# Patient Record
Sex: MALE | Race: WHITE | NOT HISPANIC OR LATINO | Employment: FULL TIME | URBAN - METROPOLITAN AREA
[De-identification: names, ages, dates, MRNs, and addresses within clinical notes are randomized per-mention and may not be internally consistent; named-entity substitution may affect disease eponyms.]

---

## 2017-10-02 ENCOUNTER — OFFICE VISIT (OUTPATIENT)
Dept: URGENT CARE | Facility: CLINIC | Age: 47
End: 2017-10-02
Payer: COMMERCIAL

## 2017-10-02 PROCEDURE — S9083 URGENT CARE CENTER GLOBAL: HCPCS

## 2017-10-02 PROCEDURE — G0382 LEV 3 HOSP TYPE B ED VISIT: HCPCS

## 2017-10-03 NOTE — PROGRESS NOTES
Assessment  1  Spider bite (989 5,E905 1) (T63 301A)   2  Cellulitis (682 9) (L03 90)    Plan  Cellulitis    · Cephalexin 500 MG Oral Capsule; TAKE 1 CAPSULE 4 TIMES DAILY UNTIL GONE    Discussion/Summary  Discussion Summary:   1) take abx as prescribedwarm compresses to area several times a daytylenol/motrin for pain  Medication Side Effects Reviewed: Possible side effects of new medications were reviewed with the patient/guardian today  Understands and agrees with treatment plan: The treatment plan was reviewed with the patient/guardian  The patient/guardian understands and agrees with the treatment plan   Counseling Documentation With Imm: The patient, patient's family was counseled regarding instructions for management,-patient and family education,-importance of compliance with treatment  Chief Complaint  1  Skin Wound  Chief Complaint Free Text Note Form: Pt has a skin wound on his right buttock for a week and a half  History of Present Illness  HPI: 52yo M p/w insect bite to buttock x 1 5 weeks  Pt states he had a small bump on R buttock with some irritation but wound suddenly became larger, painful, warm and red x 2 days  Hospital Based Practices Required Assessment:   Abuse And Domestic Violence Screen    Yes, the patient is safe at home -The patient states no one is hurting them  Depression And Suicide Screen  No, the patient has not had thoughts of hurting themself  No, the patient has not felt depressed in the past 7 days  Prefered Language is  [place]  Primary Language is  English  Skin Wound: [facility] presents with complaints of skin wound  Review of Systems  Focused-Male:   Constitutional: no fever or chills, feels well, no tiredness, no recent weight loss or weight gain  ENT: no complaints of earache, no loss of hearing, no nosebleeds or nasal discharge, no sore throat or hoarseness     Cardiovascular: no complaints of slow or fast heart rate, no chest pain, no palpitations, no leg claudication or lower extremity edema  Respiratory: no complaints of shortness of breath, no wheezing or cough, no dyspnea on exertion, no orthopnea or PND  Gastrointestinal: no complaints of abdominal pain, no constipation, no nausea or vomiting, no diarrhea or bloody stools  Genitourinary: no complaints of dysuria or incontinence, no hesitancy, no nocturia, no genital lesion, no inadequacy of penile erection  Musculoskeletal: no complaints of arthralgia, no myalgia, no joint swelling or stiffness, no limb pain or swelling  Integumentary: skin wound, but-no rashes,-no itching,-no dry skin-and-no skin lesions  Neurological: no complaints of headache, no confusion, no numbness or tingling, no dizziness or fainting  ROS Reviewed:   ROS reviewed  Past Medical History  Active Problems And Past Medical History Reviewed: The active problems and past medical history were reviewed and updated today  Family History  Family History Reviewed: The family history was reviewed and updated today  Social History  Social History Reviewed: The social history was reviewed and is unchanged  Surgical History  Surgical History Reviewed: The surgical history was reviewed and updated today  Current Meds   1  No Reported Medications Recorded  Medication List Reviewed: The medication list was reviewed and updated today  Allergies  1  No Known Drug Allergies    Vitals  Signs   Recorded: [id number] 12:06PM   Temperature: 99 1 F  Heart Rate: 87  Respiration: 18  Systolic: 349  Diastolic: 86  Weight: 384 lb 1 81 oz  O2 Saturation: 95    Physical Exam    Constitutional   General appearance: No acute distress, well appearing and well nourished  Eyes   Conjunctiva and lids: No swelling, erythema, or discharge  Pupils and irises: Equal, round and reactive to light      Ears, Nose, Mouth, and Throat   External inspection of ears and nose: Normal     Pulmonary   Respiratory effort: No increased work of breathing or signs of respiratory distress  Auscultation of lungs: Clear to auscultation  no rales or crackles were heard bilaterally  no rhonchi  no friction rub  no wheezing  Cardiovascular   Palpation of heart: Normal PMI, no thrills  Auscultation of heart: Normal rate and rhythm, normal S1 and S2, without murmurs  Examination of extremities for edema and/or varicosities: Normal     Abdomen   Abdomen: Non-tender, no masses  Liver and spleen: No hepatomegaly or splenomegaly  Skin   Skin and subcutaneous tissue: Abnormal  -6cm diameter erythematous, warm to touch erosion with necrotic center on R inferior buttock  Neurologic   Cranial nerves: Cranial nerves 2-12 intact  Reflexes: 2+ and symmetric  Sensation: No sensory loss      Psychiatric   Orientation to person, place and time: Normal     Mood and affect: Normal        Signatures   Electronically signed by : LUCIA Bush; Oct  2 2017 12:40PM EST                       (Author)    Electronically signed by : KELTON Guerrier ; Oct  2 2017  1:24PM EST                       (Co-author)

## 2018-07-09 ENCOUNTER — HOSPITAL ENCOUNTER (EMERGENCY)
Facility: HOSPITAL | Age: 48
Discharge: HOME | End: 2018-07-09
Attending: EMERGENCY MEDICINE
Payer: COMMERCIAL

## 2018-07-09 VITALS
DIASTOLIC BLOOD PRESSURE: 99 MMHG | OXYGEN SATURATION: 100 % | HEART RATE: 78 BPM | HEIGHT: 72 IN | BODY MASS INDEX: 31.15 KG/M2 | TEMPERATURE: 98.3 F | RESPIRATION RATE: 18 BRPM | SYSTOLIC BLOOD PRESSURE: 177 MMHG | WEIGHT: 230 LBS

## 2018-07-09 DIAGNOSIS — T15.91XA FOREIGN BODY, EYE, RIGHT, INITIAL ENCOUNTER: Primary | ICD-10-CM

## 2018-07-09 PROCEDURE — 99283 EMERGENCY DEPT VISIT LOW MDM: CPT

## 2018-07-09 RX ORDER — TETRACAINE HYDROCHLORIDE 5 MG/ML
2 SOLUTION OPHTHALMIC ONCE
Status: DISCONTINUED | OUTPATIENT
Start: 2018-07-09 | End: 2018-07-09 | Stop reason: ALTCHOICE

## 2018-07-09 RX ADMIN — FLUORESCEIN SODIUM AND PROPARACAINE HYDROCHLORIDE 1 DROP: 2.5; 5 SOLUTION/ DROPS OPHTHALMIC at 00:50

## 2018-07-09 NOTE — DISCHARGE INSTRUCTIONS
Eye Foreign Body   WHAT YOU NEED TO KNOW:   You may have pain, sensitivity to light, or blurry vision for a few days  DISCHARGE INSTRUCTIONS:   Return to the emergency department if:   · You suddenly lose your vision  · You have severe eye pain  Contact your healthcare provider or ophthalmologist if:   · You have new or worse eye swelling  · Your symptoms do not get better, even after the foreign body is removed  · You have white or yellow fluid draining from your eye  · You have questions or concerns about your condition or care  Medicines: You may  need any of the following:  · Eye drops or eye ointment  may be given to prevent an infection and decrease pain  · NSAIDs , such as ibuprofen, help decrease swelling, pain, and fever  NSAIDs can cause stomach bleeding or kidney problems in certain people  If you take blood thinner medicine, always ask your healthcare provider if NSAIDs are safe for you  Always read the medicine label and follow directions  · Prescription pain medicine  may be given  Ask your healthcare provider how to take this medicine safely  Some prescription pain medicines contain acetaminophen  Do not take other medicines that contain acetaminophen without talking to your healthcare provider  Too much acetaminophen may cause liver damage  Prescription pain medicine may cause constipation  Ask your healthcare provider how to prevent or treat constipation  · Take your medicine as directed  Contact your healthcare provider if you think your medicine is not helping or if you have side effects  Tell him of her if you are allergic to any medicine  Keep a list of the medicines, vitamins, and herbs you take  Include the amounts, and when and why you take them  Bring the list or the pill bottles to follow-up visits  Carry your medicine list with you in case of an emergency  Help your eye heal:   · Do not rub your eye  This may cause more damage or infection       · Do not wear your contacts lenses until your eye heals  Ask your healthcare provider how long to follow this direction  · Wear sunglasses as directed  Sunglasses help protect the eye and decrease sensitivity to light  Prevent another EFB:   · Wear safety glasses, eye shields, or goggles  These items can prevent eye injury  Make sure the eyewear wraps around the sides of your face  Wear these items while you work with chemicals, metal, wood, or bodily fluids such as blood  Also wear protective eyewear during sports such as racquetball or swimming  Do not use regular eye glasses for eye protection  They will not protect your eyes from foreign bodies or chemicals  · Use contact lenses as directed  Wash your hands before you clean, insert, or remove your contacts  Insert and remove contact lenses correctly  Clean and change your contacts as directed to help prevent eye damage or infection  Follow up with your healthcare provider or ophthalmologist in 1 to 2 days:  Write down your questions so you remember to ask them during your visits  © 2017 [address] [facility] Information is for End User's use only and may not be sold, redistributed or otherwise used for commercial purposes  All illustrations and images included in CareNotes® are the copyrighted property of [employer]  or [employer]  The above information is an  only  It is not intended as medical advice for individual conditions or treatments  Talk to your doctor, nurse or pharmacist before following any medical regimen to see if it is safe and effective for you

## 2018-07-09 NOTE — ED PROVIDER NOTES
History  Chief Complaint   Patient presents with    Foreign Body in [address] [place] [address] TO RIGHT EYE TIMES 4 HOURS NOW RESOLVED  PATIENT REPORTEDLY DRIVING VEHICLE WITH WINDOW OPEN  ASSOCIATED TEARING  NO VISUAL DEFICITS  NO TRAUMA            None       Past Medical History:   Diagnosis Date    Hypertension        No past surgical history on file  No family history on file  I have reviewed and agree with the history as documented  Social History   Substance Use Topics    Smoking status: Current Every Day Smoker    Smokeless tobacco: Never Used    Alcohol use No        Review of Systems   Constitutional: Negative for chills and fever  HENT: Negative for ear pain, rhinorrhea and sore throat  Eyes: Positive for pain  Negative for photophobia, discharge and itching  Respiratory: Negative for cough and shortness of breath  Cardiovascular: Negative for chest pain and leg swelling  Gastrointestinal: Negative for abdominal pain, diarrhea, nausea and vomiting  Genitourinary: Negative for dysuria, flank pain, frequency and urgency  Musculoskeletal: Negative for back pain, myalgias and neck pain  Skin: Negative for rash  Neurological: Negative for dizziness, weakness, light-headedness and headaches  Hematological: Negative  Psychiatric/Behavioral: Negative for agitation, confusion and suicidal ideas  The patient is not nervous/anxious  All other systems reviewed and are negative  Physical Exam  Physical Exam   Constitutional: He is oriented to person, place, and time  He appears well-developed and well-nourished  HENT:   Nose: Nose normal    Mouth/Throat: Oropharynx is clear and moist  No oropharyngeal exudate  Eyes: Conjunctivae and EOM are normal  Pupils are equal, round, and reactive to light  Right eye exhibits no discharge  Left eye exhibits no discharge  No scleral icterus     A SMALL CHI OF DEBRIS WAS REMOVED FROM THE RIGHT LOWER LATERAL EYELID RIM  UPPER LID EVERSION NEG  FS NEG  NO CORNEAL DEFECT   Neck: Normal range of motion  Neck supple  No JVD present  No tracheal deviation present  Cardiovascular: Normal rate, regular rhythm and normal heart sounds  No murmur heard  Pulmonary/Chest: Effort normal and breath sounds normal  No respiratory distress  He has no wheezes  He has no rales  Abdominal: Soft  Bowel sounds are normal  There is no tenderness  There is no guarding  Musculoskeletal: Normal range of motion  He exhibits no edema or tenderness  Neurological: He is alert and oriented to person, place, and time  No cranial nerve deficit or sensory deficit  He exhibits normal muscle tone  5/5 motor, nl sens   Skin: Skin is warm and dry  Psychiatric: He has a normal mood and affect  His behavior is normal    Nursing note and vitals reviewed  Vital Signs  ED Triage Vitals [07/09/18 0021]   Temperature Pulse Respirations Blood Pressure SpO2   98 5 °F (36 9 °C) 83 16 (!) 183/100 100 %      Temp src Heart Rate Source Patient Position - Orthostatic VS BP Location FiO2 (%)   -- -- -- -- --      Pain Score       No Pain           Vitals:    07/09/18 0021   BP: (!) 183/100   Pulse: 83       Visual Acuity      ED Medications  Medications   tetracaine 0 5 % ophthalmic solution 2 drop (not administered)   fluorescein sodium sterile ophthalmic strip 1 strip (not administered)       Diagnostic Studies  Results Reviewed     None                 No orders to display              Procedures  Procedures       Phone Contacts  ED Phone Contact    ED Course                               [facility]  CritCare Time    Disposition  Final diagnoses:   None     ED Disposition     None      Follow-up Information    None         Patient's Medications    No medications on file     No discharge procedures on file      ED Provider  Electronically Signed by           Amy Melendez MD  07/09/18 8900

## 2018-11-16 ENCOUNTER — HOSPITAL ENCOUNTER (EMERGENCY)
Facility: HOSPITAL | Age: 48
Discharge: HOME | End: 2018-11-16
Attending: EMERGENCY MEDICINE
Payer: OTHER MISCELLANEOUS

## 2018-11-16 VITALS
HEIGHT: 71 IN | SYSTOLIC BLOOD PRESSURE: 172 MMHG | WEIGHT: 219 LBS | OXYGEN SATURATION: 98 % | BODY MASS INDEX: 30.66 KG/M2 | DIASTOLIC BLOOD PRESSURE: 93 MMHG | RESPIRATION RATE: 18 BRPM | TEMPERATURE: 97.9 F | HEART RATE: 74 BPM

## 2018-11-16 DIAGNOSIS — T15.00XA CORNEAL FOREIGN BODY: Primary | ICD-10-CM

## 2018-11-16 PROCEDURE — 90471 IMMUNIZATION ADMIN: CPT

## 2018-11-16 PROCEDURE — 90715 TDAP VACCINE 7 YRS/> IM: CPT | Performed by: EMERGENCY MEDICINE

## 2018-11-16 PROCEDURE — 99283 EMERGENCY DEPT VISIT LOW MDM: CPT

## 2018-11-16 RX ORDER — LISINOPRIL 10 MG/1
10 TABLET ORAL DAILY
COMMUNITY

## 2018-11-16 RX ORDER — OXYCODONE HYDROCHLORIDE AND ACETAMINOPHEN 5; 325 MG/1; MG/1
1 TABLET ORAL ONCE
Status: COMPLETED | OUTPATIENT
Start: 2018-11-16 | End: 2018-11-16

## 2018-11-16 RX ORDER — TETRACAINE HYDROCHLORIDE 5 MG/ML
2 SOLUTION OPHTHALMIC ONCE
Status: COMPLETED | OUTPATIENT
Start: 2018-11-16 | End: 2018-11-16

## 2018-11-16 RX ORDER — TOBRAMYCIN 3 MG/ML
1 SOLUTION/ DROPS OPHTHALMIC ONCE
Status: COMPLETED | OUTPATIENT
Start: 2018-11-16 | End: 2018-11-16

## 2018-11-16 RX ADMIN — TOBRAMYCIN 1 DROP: 3 SOLUTION/ DROPS OPHTHALMIC at 21:27

## 2018-11-16 RX ADMIN — TETRACAINE HYDROCHLORIDE 2 DROP: 5 SOLUTION OPHTHALMIC at 21:19

## 2018-11-16 RX ADMIN — OXYCODONE HYDROCHLORIDE AND ACETAMINOPHEN 1 TABLET: 5; 325 TABLET ORAL at 21:27

## 2018-11-16 RX ADMIN — TETANUS TOXOID, REDUCED DIPHTHERIA TOXOID AND ACELLULAR PERTUSSIS VACCINE, ADSORBED 0.5 ML: 5; 2.5; 8; 8; 2.5 SUSPENSION INTRAMUSCULAR at 21:27

## 2018-11-17 ENCOUNTER — HOSPITAL ENCOUNTER (EMERGENCY)
Facility: HOSPITAL | Age: 48
Discharge: HOME | End: 2018-11-17
Attending: EMERGENCY MEDICINE | Admitting: EMERGENCY MEDICINE
Payer: OTHER MISCELLANEOUS

## 2018-11-17 VITALS
RESPIRATION RATE: 20 BRPM | BODY MASS INDEX: 30.56 KG/M2 | SYSTOLIC BLOOD PRESSURE: 168 MMHG | WEIGHT: 218.26 LBS | HEIGHT: 71 IN | TEMPERATURE: 97.7 F | DIASTOLIC BLOOD PRESSURE: 93 MMHG | OXYGEN SATURATION: 94 % | HEART RATE: 61 BPM

## 2018-11-17 DIAGNOSIS — H16.001 CORNEAL ULCERATION, RIGHT: Primary | ICD-10-CM

## 2018-11-17 DIAGNOSIS — S05.01XD: ICD-10-CM

## 2018-11-17 DIAGNOSIS — H16.9: ICD-10-CM

## 2018-11-17 PROCEDURE — 99283 EMERGENCY DEPT VISIT LOW MDM: CPT

## 2018-11-17 NOTE — ED NOTES
Labeled tobrex 2-3 drops 3 times a day while awake per dr Karin Alvarez instructions  Pt denied questions or needs        Davin Wright RN  11/16/18 0128

## 2018-11-17 NOTE — DISCHARGE INSTRUCTIONS
Continue tobramycin drops as prescribed follow up promptly with Ophthalmology today  Corneal Ulcer   WHAT YOU NEED TO KNOW:   A corneal ulcer is an open sore on your cornea  The cornea is the smooth, clear outer layer of your eye  A corneal ulcer is caused by bacteria that get into your eye, such as through a scratch  DISCHARGE INSTRUCTIONS:   Medicines:   · NSAIDs:  These medicines decrease swelling, pain, and fever  NSAIDs are available without a doctor's order  Ask your healthcare provider which medicine is right for you  Ask how much to take and when to take it  Take as directed  NSAIDs can cause stomach bleeding and kidney problems if not taken correctly  · Antibiotic eye medicine: This is given to treat an infection caused by bacteria  It may be in eyedrops or an ointment  · Cycloplegic eye medicine: This will dilate your pupil and relax your eye muscles, which will decrease your pain  · Pain medicines: You may be given prescription medicine to take away or decrease pain  Do not wait until the pain is severe before you take your medicine  · Take your medicine as directed  Contact your healthcare provider if you think your medicine is not helping or if you have side effects  Tell him of her if you are allergic to any medicine  Keep a list of the medicines, vitamins, and herbs you take  Include the amounts, and when and why you take them  Bring the list or the pill bottles to follow-up visits  Carry your medicine list with you in case of an emergency  Follow up with your healthcare provider or eye specialist within 24 hours: You may need to see your eye specialist every 1 to 3 days if your condition is severe  Write down your questions so you remember to ask them during your visits  Manage your symptoms:   · Apply a warm compress:  Wet a washcloth with warm water and place it on your eye  This will help decrease swelling and pain  Use as often as directed      · Clean around your eye: Gently remove any crusty buildup around your eye  · Use eyedrops: This will keep your eyes moist and help decrease pain  · Use safety equipment:  Wear sunglasses or safety goggles to avoid another injury  · Ask about your contacts:  Do not wear contact lenses until your healthcare provider says it is okay  Always clean your contact lenses with proper contact   Contact your healthcare provider or eye specialist if:   · Your vision gets worse  · Your symptoms do not improve with treatment  · You have questions or concerns about your condition or care  Return to the emergency department if:   · You have severe eye pain  · You lose your vision  · You think your corneal ulcer is getting bigger  · You injure your eye again  © 2017 [address] [facility] Information is for End User's use only and may not be sold, redistributed or otherwise used for commercial purposes  All illustrations and images included in CareNotes® are the copyrighted property of [employer]  or [employer]  The above information is an  only  It is not intended as medical advice for individual conditions or treatments  Talk to your doctor, nurse or pharmacist before following any medical regimen to see if it is safe and effective for you  Keratitis   WHAT YOU NEED TO KNOW:   Keratitis is inflammation of the cornea  The cornea is the clear layer that covers the front of your eye  Keratitis usually affects one eye, but you can have it in both eyes  DISCHARGE INSTRUCTIONS:   Medicines:   · Infection medicine: You may be given medicine to treat an infection caused by bacteria, a fungus, or a virus  These medicines may be given as eyedrops or as pills  · Steroids: This medicine decreases inflammation  It is given as eyedrops  · Take your medicine as directed    Contact your healthcare provider if you think your medicine is not helping or if you have side effects  Tell him of her if you are allergic to any medicine  Keep a list of the medicines, vitamins, and herbs you take  Include the amounts, and when and why you take them  Bring the list or the pill bottles to follow-up visits  Carry your medicine list with you in case of an emergency  Follow up with or ophthalmologist as directed:  Write down your questions so you remember to ask them during your visits  Help prevent keratitis:   · Use contact lenses correctly  Know how long to use them and how to clean and store them properly  · Wash your hands often  Use soap and water  Wash your hands after you use the bathroom, change a child's diapers, or sneeze  Wash your hands before you prepare or eat food  · Wear safety equipment when you work, garden, or play sports  · Do not rub your eyes while you work with wood or metal   Contact your ophthalmologist if:   · You have a fever  · Your symptoms get worse, even after treatment  · You have pus draining from your eye  · You have questions or concerns about your condition or care  Return to the emergency department if:   · You have severe eye pain  · You have a sudden change in your vision  · You suddenly lose your vision  © 2017 [address] [employer] Information is for End User's use only and may not be sold, redistributed or otherwise used for commercial purposes  All illustrations and images included in CareNotes® are the copyrighted property of [employer]  or [employer]  The above information is an  only  It is not intended as medical advice for individual conditions or treatments  Talk to your doctor, nurse or pharmacist before following any medical regimen to see if it is safe and effective for you

## 2018-11-17 NOTE — ED PROVIDER NOTES
History  Chief Complaint   Patient presents with    Eye Pain     Patient c/o pain in right eye since Thursday, states "got rust in it at work "  Was to urgent care and [facility] last night  Currently on Cipro drops  Patient is a 18-year-old male who recently had a foreign body in his right cornea removed at urgent care and then had bur  Procedure Done to remove rust ring around the foreign body and has since had pain in the right high he was seen at the emergency department at [facility] for the same last night was prescribed Vicodin which did not help and therefore called EMS this morning for continued eye pain  Patient reports blurring of vision in the right eye but is able to perceive light and object still  No injury to the left eye  History provided by:  Patient  Eye Problem   Location:  Right eye  Quality:  Burning  Severity:  Moderate  Onset quality:  Gradual  Duration:  1 day  Timing:  Constant  Progression:  Worsening  Chronicity:  New  Context: foreign body and machinery    Foreign body:  Metal  Associated symptoms: blurred vision, discharge, inflammation, redness and tearing    Associated symptoms: no headaches, no nausea, no numbness, no vomiting and no weakness    Risk factors: previous injury to eye        Prior to Admission Medications   Prescriptions Last Dose Informant Patient Reported? Taking?   lisinopril (ZESTRIL) 10 mg tablet   Yes No   Sig: Take 10 mg by mouth daily   tobramycin (TOBREX) 0 3 % OINT   No No   Sig: Administer 0 5 inches to the right eye 3 (three) times a day      Facility-Administered Medications: None       Past Medical History:   Diagnosis Date    Hypertension        Past Surgical History:   Procedure Laterality Date    NO PAST SURGERIES         History reviewed  No pertinent family history  I have reviewed and agree with the history as documented      Social History   Substance Use Topics    Smoking status: Current Every Day Smoker     Packs/day: 1 00    Smokeless tobacco: Never Used    Alcohol use Yes      Comment: occasionally        Review of Systems   Constitutional: Negative for activity change, appetite change, chills, fatigue and fever  HENT: Negative for congestion, ear pain, rhinorrhea and sore throat  Eyes: Positive for blurred vision, pain, discharge, redness and visual disturbance  Respiratory: Negative for cough, chest tightness, shortness of breath and wheezing  Cardiovascular: Negative for chest pain and palpitations  Gastrointestinal: Negative for abdominal pain, constipation, diarrhea, nausea and vomiting  Endocrine: Negative for polydipsia and polyuria  Genitourinary: Negative for difficulty urinating, dysuria, frequency, hematuria and urgency  Musculoskeletal: Negative for arthralgias and myalgias  Skin: Negative for color change, pallor and rash  Neurological: Negative for dizziness, weakness, light-headedness, numbness and headaches  Hematological: Negative for adenopathy  Does not bruise/bleed easily  All other systems reviewed and are negative  Physical Exam  Physical Exam   Constitutional: He is oriented to person, place, and time  He appears well-developed and well-nourished  HENT:   Head: Normocephalic and atraumatic  Right Ear: External ear normal    Left Ear: External ear normal    Nose: Nose normal    Mouth/Throat: Oropharynx is clear and moist    Eyes: Pupils are equal, round, and reactive to light  EOM and lids are normal  Lids are everted and swept, no foreign bodies found  Right conjunctiva is injected  Slit lamp exam:       The right eye shows corneal ulcer and fluorescein uptake  Neck: Normal range of motion  Neck supple  Cardiovascular: Normal rate, regular rhythm, normal heart sounds and intact distal pulses  Pulmonary/Chest: Effort normal and breath sounds normal  No respiratory distress  He has no wheezes  He has no rales  He exhibits no tenderness  Abdominal: Soft  Bowel sounds are normal  He exhibits no distension  There is no tenderness  There is no guarding  Musculoskeletal: Normal range of motion  Neurological: He is alert and oriented to person, place, and time  No cranial nerve deficit or sensory deficit  Skin: Skin is warm and dry  Psychiatric: He has a normal mood and affect  Nursing note and vitals reviewed  Vital Signs  ED Triage Vitals [11/17/18 0549]   Temperature Pulse Respirations Blood Pressure SpO2   97 7 °F (36 5 °C) 61 20 168/93 94 %      Temp Source Heart Rate Source Patient Position - Orthostatic VS BP Location FiO2 (%)   Temporal Monitor Sitting Left arm --      Pain Score       No Pain           Vitals:    11/17/18 0549   BP: 168/93   Pulse: 61   Patient Position - Orthostatic VS: Sitting       Visual Acuity      ED Medications  Medications - No data to display    Diagnostic Studies  Results Reviewed     None                 No orders to display              Procedures  Procedures       Phone Contacts  ED Phone Contact    ED Course        right eye anesthetized with  Flucaine Drops in the emergency department he had relief of symptoms and felt much improved   exam in ED is consistent with right eye corneal ulceration likely secondary to recent foreign body removed patient is currently using the tobramycin drops he was prescribed any has them with him and is advised to continue using them as prescribed       I contacted our on-call ophthalmology group and spoke with their on-call staff they discussed the case with Dr Tiago Angeles who is on-call currently and they have arranged an appointment for the patient at 7:00 a m  In their office this morning for further evaluation and treatment the patient will be discharged this morning with instructions to proceed directly to this appointment for further evaluation and treatment return precautions and anticipatory guidance discussed                  MDM  Number of Diagnoses or Management Options  Corneal ulceration, right: new and does not require workup  Infected corneal abrasion, right, subsequent encounter: new and does not require workup  Risk of Complications, Morbidity, and/or Mortality  Presenting problems: low  Management options: low    Patient Progress  Patient progress: stable    CritCare Time    Disposition  Final diagnoses:   Corneal ulceration, right   Infected corneal abrasion, right, subsequent encounter     Time reflects when diagnosis was documented in both MDM as applicable and the Disposition within this note     Time User Action Codes Description Comment    11/17/2018  6:00 AM Floyd Smalls Add [S05 01XA] Abrasion of right cornea, initial encounter     11/17/2018  6:00 AM Floyd Smalls Add [H16 001] Corneal ulceration, right     11/17/2018  6:01 AM Floyd Moreno Modify [H16 001] Corneal ulceration, right     11/17/2018  6:01 AM Remaley, Lazarus Slim Remove [S05 01XA] Abrasion of right cornea, initial encounter     11/17/2018  6:01 AM Remaley, Lazarus Slim Add [S05 01XD] Infected corneal abrasion, right, subsequent encounter       ED Disposition     ED Disposition Condition Comment    Discharge  Marlonfarshad Rupert discharge to home/self care  Condition at discharge: Stable        Follow-up Information     Follow up With Specialties Details Why Contact Info    Linus Prasad OD Optometry Go today  office staff will call you today to arrange follow-up appointment for further  evaluation and treatment [id number]      Didi Montgomery MD Ophthalmology Call As needed [address] [place] [address],  [address] [phone]            Patient's Medications   Discharge Prescriptions    No medications on file     No discharge procedures on file      ED Provider  Electronically Signed by           Saintclair Gosselin, DO  11/17/18 6992

## 2018-11-17 NOTE — DISCHARGE INSTRUCTIONS
Eye Foreign Body   WHAT YOU NEED TO KNOW:   You may have pain, sensitivity to light, or blurry vision for a few days  DISCHARGE INSTRUCTIONS:   Return to the emergency department if:   · You suddenly lose your vision  · You have severe eye pain  Contact your healthcare provider or ophthalmologist if:   · You have new or worse eye swelling  · Your symptoms do not get better, even after the foreign body is removed  · You have white or yellow fluid draining from your eye  · You have questions or concerns about your condition or care  Medicines: You may  need any of the following:  · Eye drops or eye ointment  may be given to prevent an infection and decrease pain  · NSAIDs , such as ibuprofen, help decrease swelling, pain, and fever  NSAIDs can cause stomach bleeding or kidney problems in certain people  If you take blood thinner medicine, always ask your healthcare provider if NSAIDs are safe for you  Always read the medicine label and follow directions  · Prescription pain medicine  may be given  Ask your healthcare provider how to take this medicine safely  Some prescription pain medicines contain acetaminophen  Do not take other medicines that contain acetaminophen without talking to your healthcare provider  Too much acetaminophen may cause liver damage  Prescription pain medicine may cause constipation  Ask your healthcare provider how to prevent or treat constipation  · Take your medicine as directed  Contact your healthcare provider if you think your medicine is not helping or if you have side effects  Tell him of her if you are allergic to any medicine  Keep a list of the medicines, vitamins, and herbs you take  Include the amounts, and when and why you take them  Bring the list or the pill bottles to follow-up visits  Carry your medicine list with you in case of an emergency  Help your eye heal:   · Do not rub your eye  This may cause more damage or infection       · Do not wear your contacts lenses until your eye heals  Ask your healthcare provider how long to follow this direction  · Wear sunglasses as directed  Sunglasses help protect the eye and decrease sensitivity to light  Prevent another EFB:   · Wear safety glasses, eye shields, or goggles  These items can prevent eye injury  Make sure the eyewear wraps around the sides of your face  Wear these items while you work with chemicals, metal, wood, or bodily fluids such as blood  Also wear protective eyewear during sports such as racquetball or swimming  Do not use regular eye glasses for eye protection  They will not protect your eyes from foreign bodies or chemicals  · Use contact lenses as directed  Wash your hands before you clean, insert, or remove your contacts  Insert and remove contact lenses correctly  Clean and change your contacts as directed to help prevent eye damage or infection  Follow up with your healthcare provider or ophthalmologist in 1 to 2 days:  Write down your questions so you remember to ask them during your visits  © 2017 [facility] Information is for End User's use only and may not be sold, redistributed or otherwise used for commercial purposes  All illustrations and images included in CareNotes® are the copyrighted property of [employer]  or Zuhair Machado  The above information is an  only  It is not intended as medical advice for individual conditions or treatments  Talk to your doctor, nurse or pharmacist before following any medical regimen to see if it is safe and effective for you

## 2018-11-17 NOTE — ED PROVIDER NOTES
History  Chief Complaint   Patient presents with    Eye Injury     foreign body in right eye since thursday  went to [facility] earlier today and "they removed one piece of it and ground another piece out" Pain unbearable now     Patient is a 41-year-old man who works as a   He got rust in his eye 2 days ago and he went to [facility] earlier today and the foreign body was removed and the rust ring was ground out  He was discharged home with a prescription for eyedrops which she has not yet filled  He did not receive anything for pain  He is now here because he is having a foreign body sensation in his right eye and he needs pain relief  Patient says that his vision is normal   He has had no fevers or chills  He has no discharge  He denies any signs or symptoms of infection            Prior to Admission Medications   Prescriptions Last Dose Informant Patient Reported? Taking?   lisinopril (ZESTRIL) 10 mg tablet   Yes Yes   Sig: Take 10 mg by mouth daily      Facility-Administered Medications: None       Past Medical History:   Diagnosis Date    Hypertension        Past Surgical History:   Procedure Laterality Date    NO PAST SURGERIES         History reviewed  No pertinent family history  I have reviewed and agree with the history as documented  Social History   Substance Use Topics    Smoking status: Current Every Day Smoker     Packs/day: 1 00    Smokeless tobacco: Never Used    Alcohol use Yes      Comment: occasionally        Review of Systems   Constitutional: Negative for chills, fatigue, fever and unexpected weight change  HENT: Negative for congestion and nosebleeds  Eyes: Negative for visual disturbance  Respiratory: Negative for chest tightness and shortness of breath  Cardiovascular: Negative for chest pain, palpitations and leg swelling  Gastrointestinal: Negative for abdominal pain, blood in stool, diarrhea, nausea and vomiting     Endocrine: Negative for cold intolerance and heat intolerance  Genitourinary: Negative for difficulty urinating  Musculoskeletal: Negative for arthralgias, back pain, gait problem, joint swelling and myalgias  Skin: Negative for rash  Neurological: Negative for dizziness, speech difficulty, weakness and headaches  Psychiatric/Behavioral: Negative for behavioral problems, confusion, self-injury and suicidal ideas  All other systems reviewed and are negative  Physical Exam  Physical Exam   Constitutional: He is oriented to person, place, and time  He appears well-developed and well-nourished  HENT:   Head: Normocephalic and atraumatic  Nose: Nose normal    Eyes: Pupils are equal, round, and reactive to light  EOM are normal    Even without floor seen I can see where he had the foreign body removed  It is to the nasal side of the pupil over the iris  There is no sign or symptom of infection  There is no discharge  Visual acuities are noted  Neck: Normal range of motion  Neck supple  Cardiovascular: Normal rate, regular rhythm and normal heart sounds  Exam reveals no gallop and no friction rub  No murmur heard  Pulmonary/Chest: Effort normal and breath sounds normal  No respiratory distress  He has no wheezes  He has no rales  Abdominal: Soft  He exhibits no distension  There is no tenderness  There is no rebound and no guarding  Musculoskeletal: Normal range of motion  He exhibits no edema  Neurological: He is alert and oriented to person, place, and time  Skin: Skin is warm and dry  Psychiatric: He has a normal mood and affect  His behavior is normal  Judgment and thought content normal    Nursing note and vitals reviewed        Vital Signs  ED Triage Vitals [11/16/18 2105]   Temperature Pulse Respirations Blood Pressure SpO2   97 9 °F (36 6 °C) 74 18 (!) 172/93 98 %      Temp src Heart Rate Source Patient Position - Orthostatic VS BP Location FiO2 (%)   -- -- -- -- --      Pain Score       5 Vitals:    11/16/18 2105   BP: (!) 172/93   Pulse: 74       Visual Acuity  Visual Acuity      Most Recent Value   Visual acuity R eye is  -- [states "i cant see anything  its all a big blur"]   Visual acuity Left eye is  20/30   Visual acuity in both eyes is  20/30          ED Medications  Medications   tetracaine 0 5 % ophthalmic solution 2 drop (2 drops Right Eye Given 11/16/18 2119)   tetanus-diphtheria-acellular pertussis (BOOSTRIX) IM injection 0 5 mL (0 5 mL Intramuscular Given 11/16/18 2127)   oxyCODONE-acetaminophen (PERCOCET) 5-325 mg per tablet 1 tablet (1 tablet Oral Given 11/16/18 2127)   tobramycin (TOBREX) 0 3 % ophthalmic solution 1 drop (1 drop Right Eye Given 11/16/18 2127)       Diagnostic Studies  Results Reviewed     None                 No orders to display              Procedures  Procedures       Phone Contacts  ED Phone Contact    ED Course  ED Course as of Nov 17 0233 Fri Nov 16, 2018 2119 Patient is here for pain relief  I will put a cycloplegic in his eye and gave him immediate relief with tetracaine  I will discharge with several Vicodin for him to follow up with Ophthalmology tomorrow morning  MDM  CritCare Time    Disposition  Final diagnoses:   Corneal foreign body     Time reflects when diagnosis was documented in both MDM as applicable and the Disposition within this note     Time User Action Codes Description Comment    11/16/2018  9:24 PM Vania Keene Add [T15 00XA] Corneal foreign body       ED Disposition     ED Disposition Condition Comment    Discharge  [facility] discharge to home/self care      Condition at discharge: Good        Follow-up Information     Follow up With Specialties Details Why [address] [facility] [address] [place] [address]      Ila Juárez MD Ophthalmology Call  [address] [place] [address] Drive,  [address] [phone]            Discharge Medication List as of 11/16/2018  9:26 PM      START taking these medications    Details   tobramycin (TOBREX) 0 3 % OINT Administer 0 5 inches to the right eye 3 (three) times a day, Starting Fri 11/16/2018, Print         CONTINUE these medications which have NOT CHANGED    Details   lisinopril (ZESTRIL) 10 mg tablet Take 10 mg by mouth daily, Historical Med           No discharge procedures on file      ED Provider  Electronically Signed by           Andrea Hand MD  11/17/18 5621

## 2019-06-16 ENCOUNTER — APPOINTMENT (EMERGENCY)
Dept: RADIOLOGY | Facility: HOSPITAL | Age: 49
End: 2019-06-16
Payer: COMMERCIAL

## 2019-06-16 ENCOUNTER — HOSPITAL ENCOUNTER (OUTPATIENT)
Facility: HOSPITAL | Age: 49
Setting detail: OBSERVATION
Discharge: HOME | End: 2019-06-16
Attending: EMERGENCY MEDICINE | Admitting: INTERNAL MEDICINE
Payer: COMMERCIAL

## 2019-06-16 VITALS
HEIGHT: 71 IN | BODY MASS INDEX: 28.7 KG/M2 | DIASTOLIC BLOOD PRESSURE: 76 MMHG | SYSTOLIC BLOOD PRESSURE: 140 MMHG | HEART RATE: 65 BPM | OXYGEN SATURATION: 96 % | RESPIRATION RATE: 19 BRPM | TEMPERATURE: 97 F | WEIGHT: 205 LBS

## 2019-06-16 DIAGNOSIS — R07.9 CHEST PAIN: Primary | ICD-10-CM

## 2019-06-16 DIAGNOSIS — D72.829 LEUKOCYTOSIS: ICD-10-CM

## 2019-06-16 DIAGNOSIS — R77.8 ELEVATED TROPONIN: ICD-10-CM

## 2019-06-16 LAB
ANION GAP SERPL CALCULATED.3IONS-SCNC: 6 MMOL/L (ref 4–13)
ATRIAL RATE: 89 BPM
BASOPHILS # BLD AUTO: 0.1 THOUSANDS/ΜL (ref 0–0.1)
BASOPHILS NFR BLD AUTO: 0 % (ref 0–2)
BUN SERPL-MCNC: 24 MG/DL (ref 7–25)
CALCIUM SERPL-MCNC: 9.1 MG/DL (ref 8.6–10.5)
CHLORIDE SERPL-SCNC: 104 MMOL/L (ref 98–107)
CO2 SERPL-SCNC: 28 MMOL/L (ref 21–31)
CREAT SERPL-MCNC: 0.95 MG/DL (ref 0.7–1.3)
EOSINOPHIL # BLD AUTO: 0.3 THOUSAND/ΜL (ref 0–0.61)
EOSINOPHIL NFR BLD AUTO: 2 % (ref 0–5)
ERYTHROCYTE [DISTWIDTH] IN BLOOD BY AUTOMATED COUNT: 14.7 % (ref 11.5–14.5)
GFR SERPL CREATININE-BSD FRML MDRD: 95 ML/MIN/1.73SQ M
GLUCOSE SERPL-MCNC: 109 MG/DL (ref 65–99)
HCT VFR BLD AUTO: 32.5 % (ref 42–47)
HGB BLD-MCNC: 10.7 G/DL (ref 14–18)
LYMPHOCYTES # BLD AUTO: 1.2 THOUSANDS/ΜL (ref 0.6–4.47)
LYMPHOCYTES NFR BLD AUTO: 7 % (ref 21–51)
MCH RBC QN AUTO: 27.2 PG (ref 26–34)
MCHC RBC AUTO-ENTMCNC: 32.9 G/DL (ref 31–37)
MCV RBC AUTO: 83 FL (ref 81–99)
MONOCYTES # BLD AUTO: 1.3 THOUSAND/ΜL (ref 0.17–1.22)
MONOCYTES NFR BLD AUTO: 8 % (ref 2–12)
NEUTROPHILS # BLD AUTO: 14 THOUSANDS/ΜL (ref 1.4–6.5)
NEUTS SEG NFR BLD AUTO: 83 % (ref 42–75)
P AXIS: 81 DEGREES
PLATELET # BLD AUTO: 267 THOUSANDS/UL (ref 149–390)
PMV BLD AUTO: 9 FL (ref 8.6–11.7)
POTASSIUM SERPL-SCNC: 3.8 MMOL/L (ref 3.5–5.5)
PR INTERVAL: 154 MS
QRS AXIS: 73 DEGREES
QRSD INTERVAL: 102 MS
QT INTERVAL: 416 MS
QTC INTERVAL: 506 MS
RBC # BLD AUTO: 3.92 MILLION/UL (ref 4.3–5.9)
SODIUM SERPL-SCNC: 138 MMOL/L (ref 134–143)
T WAVE AXIS: 55 DEGREES
TROPONIN I SERPL-MCNC: 0.04 NG/ML
TROPONIN I SERPL-MCNC: 0.04 NG/ML
VENTRICULAR RATE: 89 BPM
WBC # BLD AUTO: 16.8 THOUSAND/UL (ref 4.8–10.8)

## 2019-06-16 PROCEDURE — 36415 COLL VENOUS BLD VENIPUNCTURE: CPT | Performed by: EMERGENCY MEDICINE

## 2019-06-16 PROCEDURE — 84484 ASSAY OF TROPONIN QUANT: CPT | Performed by: EMERGENCY MEDICINE

## 2019-06-16 PROCEDURE — 71045 X-RAY EXAM CHEST 1 VIEW: CPT

## 2019-06-16 PROCEDURE — 99235 HOSP IP/OBS SAME DATE MOD 70: CPT | Performed by: INTERNAL MEDICINE

## 2019-06-16 PROCEDURE — 99244 OFF/OP CNSLTJ NEW/EST MOD 40: CPT | Performed by: PHYSICIAN ASSISTANT

## 2019-06-16 PROCEDURE — 99285 EMERGENCY DEPT VISIT HI MDM: CPT

## 2019-06-16 PROCEDURE — 93010 ELECTROCARDIOGRAM REPORT: CPT | Performed by: INTERNAL MEDICINE

## 2019-06-16 PROCEDURE — 85025 COMPLETE CBC W/AUTO DIFF WBC: CPT | Performed by: EMERGENCY MEDICINE

## 2019-06-16 PROCEDURE — 93005 ELECTROCARDIOGRAM TRACING: CPT

## 2019-06-16 PROCEDURE — 80048 BASIC METABOLIC PNL TOTAL CA: CPT | Performed by: EMERGENCY MEDICINE

## 2019-06-16 RX ORDER — OMEPRAZOLE 20 MG/1
20 CAPSULE, DELAYED RELEASE ORAL DAILY
COMMUNITY
End: 2021-07-27 | Stop reason: SDUPTHER

## 2019-06-16 RX ORDER — ALBUTEROL SULFATE 90 UG/1
2 AEROSOL, METERED RESPIRATORY (INHALATION) EVERY 6 HOURS PRN
COMMUNITY

## 2019-06-16 RX ORDER — ACETAMINOPHEN 325 MG/1
975 TABLET ORAL ONCE
Status: COMPLETED | OUTPATIENT
Start: 2019-06-16 | End: 2019-06-16

## 2019-06-16 RX ORDER — LISINOPRIL 10 MG/1
10 TABLET ORAL DAILY
Status: DISCONTINUED | OUTPATIENT
Start: 2019-06-16 | End: 2019-06-16 | Stop reason: HOSPADM

## 2019-06-16 RX ORDER — ASPIRIN 81 MG/1
81 TABLET ORAL DAILY
Status: DISCONTINUED | OUTPATIENT
Start: 2019-06-16 | End: 2019-06-16 | Stop reason: HOSPADM

## 2019-06-16 RX ORDER — NICOTINE 21 MG/24HR
1 PATCH, TRANSDERMAL 24 HOURS TRANSDERMAL DAILY
Status: DISCONTINUED | OUTPATIENT
Start: 2019-06-16 | End: 2019-06-16 | Stop reason: HOSPADM

## 2019-06-16 RX ORDER — ALBUTEROL SULFATE 90 UG/1
2 AEROSOL, METERED RESPIRATORY (INHALATION) EVERY 6 HOURS PRN
Status: DISCONTINUED | OUTPATIENT
Start: 2019-06-16 | End: 2019-06-16 | Stop reason: HOSPADM

## 2019-06-16 RX ORDER — ATORVASTATIN CALCIUM 40 MG/1
40 TABLET, FILM COATED ORAL
Status: DISCONTINUED | OUTPATIENT
Start: 2019-06-16 | End: 2019-06-16 | Stop reason: HOSPADM

## 2019-06-16 RX ORDER — PANTOPRAZOLE SODIUM 40 MG/1
40 TABLET, DELAYED RELEASE ORAL
Status: DISCONTINUED | OUTPATIENT
Start: 2019-06-16 | End: 2019-06-16 | Stop reason: HOSPADM

## 2019-06-16 RX ADMIN — PANTOPRAZOLE SODIUM 40 MG: 40 TABLET, DELAYED RELEASE ORAL at 08:38

## 2019-06-16 RX ADMIN — ASPIRIN 81 MG: 81 TABLET, COATED ORAL at 08:56

## 2019-06-16 RX ADMIN — ACETAMINOPHEN 975 MG: 325 TABLET ORAL at 06:18

## 2019-06-16 RX ADMIN — LISINOPRIL 10 MG: 10 TABLET ORAL at 08:18

## 2019-07-23 ENCOUNTER — CLINICAL SUPPORT (OUTPATIENT)
Dept: CARDIAC REHAB | Facility: HOSPITAL | Age: 49
End: 2019-07-23
Payer: COMMERCIAL

## 2019-07-23 DIAGNOSIS — Z95.1 S/P CABG (CORONARY ARTERY BYPASS GRAFT): ICD-10-CM

## 2019-07-23 PROCEDURE — 93797 PHYS/QHP OP CAR RHAB WO ECG: CPT

## 2019-07-23 NOTE — PROGRESS NOTES
CARDIAC REHAB ASSESSMENT    Today's date: 2019  Patient name: Author Channel     : 1970       MRN: [id number]  PCP: Galileo Vásquez DO  Referring Physician: Anne Marie Miller MD  Cardiologist: Janett Baptiste ([facility])  Surgeon: Dr Nitin Kelsey ([facility])  Dx: CABG x 2  Date of onset: 2019  Cultural needs: None  Height:    Wt Readings from Last 1 Encounters:   19 93 kg (205 lb)      Weight:   Ht Readings from Last 1 Encounters:   19 5' 11" (1 803 m)     Medical History:   Past Medical History:   Diagnosis Date    Hypertension     Stomach ulcer          Physical Limitations: Lifting restriction 10 pounds following 8 weeks post-surgery  Risk Factors   Cholesterol: No  Smoking: Current user:  average ppd:  5 cigarettes/day  HTN: Yes  DM: No  Obesity: No  BMI 27 0    Inactivity: No  Walking all day long; 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; does maintenance welding   Stress:  perceived  stress: 1/10   Stressors: Significant other   Goals for Stress Management: Continue to cope with stress in a positive way  Family History:No family history on file  Allergies: Patient has no known allergies  ETOH:   Social History     Substance and Sexual Activity   Alcohol Use Yes    Comment: occasionally         Current Medications:   Current Outpatient Medications   Medication Sig Dispense Refill    albuterol (PROVENTIL HFA,VENTOLIN HFA) 90 mcg/act inhaler Inhale 2 puffs every 6 (six) hours as needed for wheezing      lisinopril (ZESTRIL) 10 mg tablet Take 10 mg by mouth daily      omeprazole (PriLOSEC) 20 mg delayed release capsule Take 20 mg by mouth daily      tobramycin (TOBREX) 0 3 % OINT Administer 0 5 inches to the right eye 3 (three) times a day (Patient not taking: Reported on 2019) 3 5 g 0     No current facility-administered medications for this visit              Functional Status Prior to Diagnosis for Treatment   Occupation: full time job 40+  Recreation: biking, hiking  ADLs: No limitations  Kimberly: No limitations  Exercise: No   Other: None  Current Functional Status  Occupation: plans to return to work 9/1/2019  Not working now  Recreation: limited due to procedure  ADLs:No limitations  Kimberly: No limitations  Exercise: No    Other: None  Patient Specific Goals:  Returning to work and getting back to feeling like he is 100%  Short Term Program Goals: dietary modifications increased strength improved energy/stamina with ADLs exercise 120-150 mins/wk return to work    Long Term Goals: intial claudication time extended  increased maximal walking duration  increased intial training workload  Improved [facility] Activity Status score  Improved functional capacity  Improved Quality of Life - [facility] score reduced  Improved lipid profile    Ability to reach goals/rehabilitation potential:  Very Good     Projected return to function: 12 weeks  Objective tests: sub-max TM ETT      Nutritional   Reviewed details of Rate your Plate  Discussed key elements of heart healthy eating  Reviewed patient goals for dietary modifications and their clinical implications  Reviewed most recent lipid profile  Goals for dietary modification: reduce portions of meat to 3 oz  more meatless meals  low fat dairy   reduced fat cheese  increase fruits and vegetables  eliminate butter  low sodium  improved snack choices  more nuts/seeds  reduce sweets/frozen desserts  heathier choices while dining out      Emotional/Social  Patient reports he/she is coping well with good social support and denies depression or anxiety    SOCIAL SUPPORT NETWORK    Marital status: ; now dating someone else    Rate 1-5:    Marriage: 5   Family: 5   Financial: 5   Relationships: 5   Spirituality: 5   Intellectual: 5      Domestic Violence Screening: No    Comments: None

## 2019-07-23 NOTE — PROGRESS NOTES
Cardiac Rehabilitation Plan of Care   Care Plan       Today's date: 2019   Visits: 1- [place]  Patient name: Ken Miller      : 1970  Age: 51 y o  MRN: [id number]  Referring Physician: Lisa Whyte MD  Provider: Felisa Barbour  Clinician: Christina Nassar MS, CCRP    Dx: CABG x 2   Date of onset: 2019      SUMMARY OF PROGRESS:  Today is Mr Trung Ryan visit at Cardiac Rehab  The patient admits to 100% medication compliance  The patient completed a submaximal ETT for a total of 3 minutes at 2 2 METs  The patient denies any other physical limitations (other than 10lb lifting restriction)  At rest, Mr Ramsey Bonilla was 73 while his BP was 128/62  During the exercise test, the patient's peak HR was 96 while his peak BP was 136/60 which was taken during the first stage of the Treadmill test  Mr Kristen Lyle rated the exercise test as a "4" on the 1-10 RPE Scale  Cindy Arshad denied any symptoms during his visit  During recovery, Donal's HR was 76 while his BP was 128/62  Mr Kristen Lyle tolerated the exercise session well  Cindy Arshad did not display any abnormal telemetry readings during his visit at rest, exercise, or recovery  The patient states he has goals of returning to work in September  Long term, the patient states he would like to be smoke-free  The patient states he is currently smoking about 5 cigarettes per day  Cindytaylor Arshad also stated that he plans to go to [facility] for counseling on smoking  The patient is a good candidate for Cardiac Rehab due to high prior level of function  Cindytaylor Arshad states he is agreeable to completing CR 3 times/week for 12 weeks or 36 sessions  Medication compliance: Yes   Comments: Patient admits to 100% medication compliance  Fall Risk: Low   Comments: Patient ambulates well  EKG changes: None        EXERCISE ASSESSMENT and PLAN    Current Exercise Program in Rehab:       Frequency: 5-6 days/week        Minutes: 30-35         METS: 2 0-2 5            HR: 20-30 above RHR;    RPE: 4-6         Modalities: Treadmill, UBE, NuStep and Recumbent bike      Exercise Progression 30 Day Goals :    Frequency: 5-6 days/week   Minutes: 35-40   METS: 2 0-2 5   HR: 20-30 above RHR;     RPE: 4-6   Modalities: Treadmill, Airdyne bike, UBE, Lifecycle, Elliptical, NuStep and Recumbent bike    Strength trainin-3 days / week  12-15 repetitions  1-2 sets per modality   Will be added following at least 8 weeks post surgery and 8-10 monitored sessions   Modalities: Leg Press, Chest Press, Lateral Raise, Arm Extension, Arm Curl and Seated Row    Progressing: In Progress    Home Exercise: Type: Walking, Frequency: 5-6 days/week, Duration: 10-15 mins    Goals: 10% improvement in functional capacity, Reduced Dyspnea with physical activity  0-1/10, improved DASI score by 10%, Increase in peak CR METs by 40%, Resume ADLs with increased strength, Return to work unrestricted and Exercise 5 days/wk, >150mins/wk  Education: home exercise guidelines, signs and sxs and RPE scale   Plan:education on home exercise guidelines, home exercise 30+ mins 2 days opposite CR and Education class: Risk Factors for Heart Disease  Readiness to change: Preparation      NUTRITION ASSESSMENT AND PLAN    Weight control:    Starting weight: 205 lb   Current weight:     Waist circumference:    Startin 5   Current:    Diabetes: N/A  Lipid management: Discussed diet and lipid management and Last lipid profile 2019  Chol 101  TRG 62  HDL 24  LDL 65  Goals:reduced BMI to < 25, HDL >40 and Improved Rate Your Plate score  >50  Education: heart healthy eating  low sodium diet  hydration  diet and lipid management  Progressing: In Progress  Plan: Education class: Reading Food Labels, Education Class: Heart Healthy Eating, Increase PUFA and MUFA, Decrease SFA, increase fruits/vegs, swtich to low fat dairy and Reduce added sugars <25g/day  Readiness to change: Preparation      PSYCHOSOCIAL ASSESSMENT AND PLAN    Emotional: ([id number]:LAST)@            0 =No Depression  Self-reported stress level: 1   Social support: Excellent  Goals:  Reduce perceived stress to 1-3/10, Physical Fitness in [facility] Score < 3, Pain in [facility] Score < 3, Increased interest in doing things, improved sleep, Improved appetite, improved concentration, improved positive thoughts of well being and increased energy  Education: signs/sxs of depression, benefits of positive support system and coping mechanisms  Progressing: In Progress  Plan: Class: Stress and Your Health, Class: Relaxation and Practice relaxation techniques  Readiness to change: Preparation      OTHER CORE COMPONENTS     Tobacco:   Social History     Tobacco Use   Smoking Status Current Every Day Smoker    Packs/day: 1 00   Smokeless Tobacco Never Used       Tobacco Use Intervention: Referral to tobacco expert:   Brief counseling by cardiac rehab professional  Date: 2019  Patient states that he plans to consult with [facility] about counseling for smoking  Blood pressure:    Restin/62   Exercise: 136/60   Recovery: 128/62    Goals: consistent BP < 130/80, reduced dietary sodium <2300mg and consistent exercise >150 mins/wk  Education:  understanding HTN and CAD, low sodium diet and HTN, setting a smoking cessation plan and provide information on tobacco cessation treatment  Progressing: In Progress  Plan: Class: Understanding Heart Disease, Class: Common Heart Medications and make contact with smoking cessation program  Readiness to change: Preparation      OUTCOMES  Name: Brett Vasquez : 1970      Risk:     HIGH        Pre Post % change  Goal   Date: 2019      Physical           Sub Max ETT (mets) 2 2  -100 0% 10% increase   6MWT (feet) NA  #VALUE! 10% increase   [facility]SD Dyspnea Score NA  #VALUE! 5 pt decrease   Supplemental O2 use (L) 0      [facility]KE AI (est  peak O2) 38 2  -100 0%    COPD assessment Test (CAT) NA   2 pt decrease   Peak exercise CR/ AR (mets) 2 2  -100 0% 40% increase   Emotional           PHQ 9  (> 10 refer to MD) 0  #DIV/0! 4 pt decrease   [facility] lower score = improvement     Total  15  -100 0% < 27   Feelings 1  -100 0% < 3   Physical fitness 4  -100 0% < 3   Social Support 1  0 0% < 3   Daily activities 1  -100 0% < 3   Social Activities 1  -100 0% < 3   Pain 3  -100 0% < 3   Overall Health 2  -100 0% < 3   Quality of Life 1  -100 0% < 3   Change in health 1  -100 0% < 3   Dietary           Rate your plate 44  -688 9% > 58   Measurements           Weight 205  -100 0% 2 5-5%    BMI 27   -100 0% 19-25   Waist Circ  37 5  -100 0% < 40 M / < 35 F    BP left arm     (systolic) 307  -876 7% < 414                              (diastolic) 62  -119 5% < 90   Smoking #/day (if applicable) 5  -060 2% 0   Lipids/ Glucose (Date) 6/22/2019   -1     Total cholesterol 101  -100 0%    Triglycerides 62  -100 0% < 150   HDL 24  -100 0% 40-60   LDL 65  -100 0% < 100   A1C % 5 9  -100 0% 4 0 - 5 6%   Fasting   -100 0%           Physician signature: _______________________ _________________     Date:             Exercise Prescription       Exercise Modality  Initial Workload MET Level    Nu-Step (NU) Level: 6 Steps/Minute: 60 3 0 METs   Elliptical (E) Level: 1 RPM: 25-30 5 0 METs   Airdyne Bike (AD-Bike) Level: 0 8 2 5 METs   Arm Ergometer (AE) RPM: 40-50 Level 2 5 2 7 METs   Treadmill 2 0 mph 0% 2 5 METs   Recumbent Bike (RB) Level: 1 RPM: 50-60 3 2 METs   Resistance (RES) 5 lbs Weights 10 lbs Pulleys        Comments: Patient completed TM Submax ETT test at Stage 1 ( 3 min @ 2 2 METS)  Will start exercise at 2 0-2 5 METS and increase intensity as tolerated by patient  Will add strength training at 8 weeks post-surgery

## 2019-08-02 ENCOUNTER — CLINICAL SUPPORT (OUTPATIENT)
Dept: CARDIAC REHAB | Facility: HOSPITAL | Age: 49
End: 2019-08-02
Payer: COMMERCIAL

## 2019-08-02 DIAGNOSIS — Z95.1 S/P CABG (CORONARY ARTERY BYPASS GRAFT): ICD-10-CM

## 2019-08-02 PROCEDURE — 93798 PHYS/QHP OP CAR RHAB W/ECG: CPT

## 2019-08-05 ENCOUNTER — CLINICAL SUPPORT (OUTPATIENT)
Dept: CARDIAC REHAB | Facility: HOSPITAL | Age: 49
End: 2019-08-05
Payer: COMMERCIAL

## 2019-08-05 DIAGNOSIS — Z95.1 S/P CABG (CORONARY ARTERY BYPASS GRAFT): ICD-10-CM

## 2019-08-05 PROCEDURE — 93798 PHYS/QHP OP CAR RHAB W/ECG: CPT

## 2019-08-08 ENCOUNTER — CLINICAL SUPPORT (OUTPATIENT)
Dept: CARDIAC REHAB | Facility: HOSPITAL | Age: 49
End: 2019-08-08
Payer: COMMERCIAL

## 2019-08-08 DIAGNOSIS — Z95.1 POSTSURGICAL AORTOCORONARY BYPASS STATUS: ICD-10-CM

## 2019-08-08 PROCEDURE — 93798 PHYS/QHP OP CAR RHAB W/ECG: CPT

## 2019-08-09 ENCOUNTER — APPOINTMENT (OUTPATIENT)
Dept: CARDIAC REHAB | Facility: HOSPITAL | Age: 49
End: 2019-08-09
Payer: COMMERCIAL

## 2019-08-12 ENCOUNTER — APPOINTMENT (OUTPATIENT)
Dept: CARDIAC REHAB | Facility: HOSPITAL | Age: 49
End: 2019-08-12
Payer: COMMERCIAL

## 2019-08-14 ENCOUNTER — APPOINTMENT (OUTPATIENT)
Dept: CARDIAC REHAB | Facility: HOSPITAL | Age: 49
End: 2019-08-14
Payer: COMMERCIAL

## 2019-08-16 ENCOUNTER — APPOINTMENT (OUTPATIENT)
Dept: CARDIAC REHAB | Facility: HOSPITAL | Age: 49
End: 2019-08-16
Payer: COMMERCIAL

## 2019-08-19 ENCOUNTER — APPOINTMENT (OUTPATIENT)
Dept: CARDIAC REHAB | Facility: HOSPITAL | Age: 49
End: 2019-08-19
Payer: COMMERCIAL

## 2019-08-19 NOTE — PROGRESS NOTES
Cardiac Rehabilitation Plan of Care   30 Day      Today's date: 2019   Visits:   Patient name: Eusebio Smith      : 1970  Age: 51 y o  MRN: [id number]  Referring Physician: Pat Wing MD  Provider: Emil Iverson  Clinician: Martín Martinez MS, CCRP    Dx: CABG x 2   Date of onset: 2019      SUMMARY OF PROGRESS: On Thursday, 2019, Mr Matteo Jim came in for his Cardiac Rehab; this was his 4th out of 36 visits  At that time, the patient came in stating he did not know what day it was and apologized for coming in on a Thursday rather than a Wednesday  The patient admitted to 100% medication compliance  The patient completed 36 minutes of exercise  The patient denies any other physical limitations (other than 10lb lifting restriction)  At rest, Mr Cruz Solorio was 81 while his BP was 128/66  During the exercise test, the patient's peak HR was 110 on the Treadmill and the UBE while his peak BP was 138/68  Mr Matteo Jim rated his exercises as a "4" on the 1-10 RPE Scale  Grey Saunders denied any symptoms during his visit  During recovery, Donal's HR was 86 while his BP was 124/64  Mr Matteo Jim tolerated the exercise session well  Grey Saunders did not display any abnormal telemetry readings during his visit at rest, exercise, or recovery  The patient states he has goals of returning to work in September  Long term, the patient states he would like to be smoke-free  The patient states he is currently smoking about 5 cigarettes per day  Oliviawallace Chip also stated that he plans to go to [facility] for counseling on smoking  The patient is a good candidate for Cardiac Rehab due to high prior level of function  Oilviawallace Saunders states he is agreeable to completing CR 3 times/week for 12 weeks or 36 sessions  Grey Saunders admits to continuing smoking cigarettes  Medication compliance: Yes   Comments: Patient admits to 100% medication compliance  Fall Risk: Low   Comments: Patient ambulates well  EKG changes: None        EXERCISE ASSESSMENT and PLAN    Current Exercise Program in Rehab:       Frequency: 5-6 days/week        Minutes: 35-40        METS: 2 0-2 5            HR: 20-30 above RHR; 101-111   RPE: 4-5         Modalities: Treadmill, UBE, NuStep and Recumbent bike      Exercise Progression 30 Day Goals :    Frequency: 5-6 days/week   Minutes: 40-45   METS: 2 0-2 5   HR: 20-30 above RHR; 101-111    RPE: 4-6   Modalities: Treadmill, Airdyne bike, UBE, Lifecycle, Elliptical, NuStep and Recumbent bike    Strength trainin-3 days / week  12-15 repetitions  1-2 sets per modality   Will be added following at least 8 weeks post surgery and 8-10 monitored sessions   Modalities: Leg Press, Chest Press, Lateral Raise, Arm Extension, Arm Curl and Seated Row    Progressing: In Progress    Home Exercise: Type: Walking, Frequency: 5-6 days/week, Duration: 10-15 mins    Goals: 10% improvement in functional capacity, Reduced Dyspnea with physical activity  0-1/10, improved DASI score by 10%, Increase in peak CR METs by 40%, Resume ADLs with increased strength, Return to work unrestricted and Exercise 5 days/wk, >150mins/wk  Education: home exercise guidelines, signs and sxs and RPE scale   Plan:education on home exercise guidelines, home exercise 30+ mins 2 days opposite CR and Education class: Risk Factors for Heart Disease  Readiness to change: Preparation      NUTRITION ASSESSMENT AND PLAN    Weight control:    Starting weight: 205 lb   Current weight:     Waist circumference:    Startin 5   Current:    Diabetes: N/A  Lipid management: Discussed diet and lipid management and Last lipid profile 2019  Chol 101  TRG 62  HDL 24  LDL 65  Goals:reduced BMI to < 25, HDL >40 and Improved Rate Your Plate score  >19  Education: heart healthy eating  low sodium diet  hydration  diet and lipid management  Progressing: In Progress  Plan: Education class: Reading Food Labels, Education Class: Heart Healthy Eating, Increase PUFA and MUFA, Decrease SFA, increase fruits/vegs, swtich to low fat dairy and Reduce added sugars <25g/day  Readiness to change: Preparation      PSYCHOSOCIAL ASSESSMENT AND PLAN    Emotional: ([id number]:LAST)@            0 =No Depression  Self-reported stress level: 1   Social support: Excellent  Goals:  Reduce perceived stress to 1-3/10, Physical Fitness in [facility] Score < 3, [facility] in [facility] Score < 3, Increased interest in doing things, improved sleep, Improved appetite, improved concentration, improved positive thoughts of well being and increased energy  Education: signs/sxs of depression, benefits of positive support system and coping mechanisms  Progressing: In Progress  Plan: Class: Stress and Your Health, Class: Relaxation and Practice relaxation techniques  Readiness to change: Preparation      OTHER CORE COMPONENTS     Tobacco:   Social History     Tobacco Use   Smoking Status Current Every Day Smoker    Packs/day: 1 00   Smokeless Tobacco Never Used       Tobacco Use Intervention: Referral to tobacco expert:   Brief counseling by cardiac rehab professional  Date: 2019  Patient states that he plans to consult with [facility] about counseling for smoking  Blood pressure:    Restin/66   Exercise: 138/68   Recovery: 124/64    Goals: consistent BP < 130/80, reduced dietary sodium <2300mg and consistent exercise >150 mins/wk  Education:  understanding HTN and CAD, low sodium diet and HTN, setting a smoking cessation plan and provide information on tobacco cessation treatment  Progressing: In Progress  Plan: Class: Understanding Heart Disease, Class: Common Heart Medications and make contact with smoking cessation program  Readiness to change: Preparation

## 2019-08-21 ENCOUNTER — APPOINTMENT (OUTPATIENT)
Dept: CARDIAC REHAB | Facility: HOSPITAL | Age: 49
End: 2019-08-21
Payer: COMMERCIAL

## 2019-08-23 ENCOUNTER — APPOINTMENT (OUTPATIENT)
Dept: CARDIAC REHAB | Facility: HOSPITAL | Age: 49
End: 2019-08-23
Payer: COMMERCIAL

## 2019-08-26 ENCOUNTER — APPOINTMENT (OUTPATIENT)
Dept: CARDIAC REHAB | Facility: HOSPITAL | Age: 49
End: 2019-08-26
Payer: COMMERCIAL

## 2019-08-28 ENCOUNTER — APPOINTMENT (OUTPATIENT)
Dept: CARDIAC REHAB | Facility: HOSPITAL | Age: 49
End: 2019-08-28
Payer: COMMERCIAL

## 2019-08-30 ENCOUNTER — APPOINTMENT (OUTPATIENT)
Dept: CARDIAC REHAB | Facility: HOSPITAL | Age: 49
End: 2019-08-30
Payer: COMMERCIAL

## 2019-09-16 NOTE — PROGRESS NOTES
[facility]      Dear Dr Cornelio Ramos,    Thank you for referring your patient to our cardiac rehabilitation program  The patient has completed 4/36  visits  However, rehab is being discontinued at this time due to:    Medical Withdrawl: The patient was last seen on August 8th for Cardiac Rehab but has since been on a medical hold due to a wound complications  Patient has not been seen at Cardiac Rehab since last [address] on 8/8/19  Patient is being discharged at this time  However, when patient feels better, he was told to contact Cardiac Rehab staff when he wishes to return  Please contact us at [phone] if you have any questions about this patents case or if/when it is appropriate to re-start the patients rehab program at a later date  Thank you for your continued support of cardiac rehabilitation         Sincerely,      Dewey Bazzi, MS, CCRP

## 2021-02-03 ENCOUNTER — HOSPITAL ENCOUNTER (EMERGENCY)
Facility: HOSPITAL | Age: 51
Discharge: LEFT BEFORE DISCHARGE | End: 2021-02-03
Attending: EMERGENCY MEDICINE | Admitting: EMERGENCY MEDICINE
Payer: COMMERCIAL

## 2021-02-03 ENCOUNTER — APPOINTMENT (EMERGENCY)
Dept: RADIOLOGY | Facility: HOSPITAL | Age: 51
End: 2021-02-03
Payer: COMMERCIAL

## 2021-02-03 VITALS
HEART RATE: 60 BPM | BODY MASS INDEX: 30.48 KG/M2 | HEIGHT: 72 IN | SYSTOLIC BLOOD PRESSURE: 188 MMHG | DIASTOLIC BLOOD PRESSURE: 90 MMHG | RESPIRATION RATE: 16 BRPM | OXYGEN SATURATION: 100 % | TEMPERATURE: 98.2 F | WEIGHT: 225 LBS

## 2021-02-03 DIAGNOSIS — R06.02 SHORTNESS OF BREATH: Primary | ICD-10-CM

## 2021-02-03 PROCEDURE — NC001 PR NO CHARGE: Performed by: EMERGENCY MEDICINE

## 2021-02-03 PROCEDURE — 99285 EMERGENCY DEPT VISIT HI MDM: CPT

## 2021-02-03 NOTE — ED PROVIDER NOTES
History  Chief Complaint   Patient presents with    Shortness of Breath     started today   Diarrhea    Malaise - 7 years or greater    Anxiety       53YEAR-OLD MALE  Patient came in for shortness of breath and anxiety    Patient eloped prior to full history and w/o much exam possible       History provided by:  Patient      Prior to Admission Medications   Prescriptions Last Dose Informant Patient Reported? Taking? albuterol (PROVENTIL HFA,VENTOLIN HFA) 90 mcg/act inhaler   Yes No   Sig: Inhale 2 puffs every 6 (six) hours as needed for wheezing   lisinopril (ZESTRIL) 10 mg tablet   Yes No   Sig: Take 10 mg by mouth daily   omeprazole (PriLOSEC) 20 mg delayed release capsule   Yes No   Sig: Take 20 mg by mouth daily   tobramycin (TOBREX) 0 3 % OINT   No No   Sig: Administer 0 5 inches to the right eye 3 (three) times a day   Patient not taking: Reported on 6/16/2019      Facility-Administered Medications: None       Past Medical History:   Diagnosis Date    Hypertension     Stomach ulcer        Past Surgical History:   Procedure Laterality Date    HERNIA REPAIR      NO PAST SURGERIES         History reviewed  No pertinent family history  I have reviewed and agree with the history as documented  E-Cigarette/Vaping     E-Cigarette/Vaping Substances     Social History     Tobacco Use    Smoking status: Current Every Day Smoker     Packs/day: 1 00    Smokeless tobacco: Never Used   Substance Use Topics    Alcohol use: Yes     Comment: occasionally    Drug use: No       Review of Systems   Unable to perform ROS: Other (Patient eloped)   Respiratory: Negative for stridor  Skin: Negative for wound  Physical Exam  Physical Exam  Constitutional:       General: He is not in acute distress  Appearance: He is well-developed  He is not ill-appearing, toxic-appearing or diaphoretic  HENT:      Head: Normocephalic and atraumatic  Nose: Nose normal    Eyes:      General: No scleral icterus  Right eye: No discharge  Left eye: No discharge  Conjunctiva/sclera: Conjunctivae normal    Neck:      Musculoskeletal: No neck rigidity or muscular tenderness  Cardiovascular:      Comments: Full Exam deferred on initial exam due to use of PAPR  Pulmonary:      Effort: Pulmonary effort is normal  No tachypnea, accessory muscle usage or respiratory distress  Breath sounds: Normal breath sounds  No stridor  Comments: Full Exam deferred on initial exam due to use of PAPR  Abdominal:      General: There is no distension  Tenderness: There is no right CVA tenderness or left CVA tenderness  Hernia: No hernia is present  Comments: Full Exam deferred on initial exam due to use of PAPR   Musculoskeletal: Normal range of motion  General: No swelling, tenderness, deformity or signs of injury  Skin:     Coloration: Skin is not cyanotic, jaundiced or pale  Findings: No bruising, ecchymosis, erythema, lesion or rash  Neurological:      General: No focal deficit present  Mental Status: He is alert and oriented to person, place, and time  Mental status is at baseline  Cranial Nerves: No cranial nerve deficit  Sensory: No sensory deficit  Motor: No weakness or abnormal muscle tone  Coordination: Coordination normal       Gait: Gait normal    Psychiatric:         Behavior: Behavior is agitated  Thought Content:  Thought content normal          Judgment: Judgment normal          Vital Signs  ED Triage Vitals [02/03/21 0145]   Temperature Pulse Respirations Blood Pressure SpO2   98 2 °F (36 8 °C) 60 16 (!) 188/90 100 %      Temp Source Heart Rate Source Patient Position - Orthostatic VS BP Location FiO2 (%)   Tympanic Monitor Sitting Left arm --      Pain Score       --           Vitals:    02/03/21 0145   BP: (!) 188/90   Pulse: 60   Patient Position - Orthostatic VS: Sitting         Visual Acuity      ED Medications  Medications - No data to display    Diagnostic Studies  Results Reviewed     Procedure Component Value Units Date/Time    CBC and differential [[id number]]     Lab Status: No result Specimen: Blood     Protime-INR [[id number]]     Lab Status: No result Specimen: Blood     APTT [[id number]]     Lab Status: No result Specimen: Blood     Comprehensive metabolic panel [[id number]]     Lab Status: No result Specimen: Blood     Troponin I [[id number]]     Lab Status: No result Specimen: Blood     Magnesium [[id number]]     Lab Status: No result Specimen: Blood     COVID19, Influenza A/B, RSV PCR, [facility] [[id number]]     Lab Status: No result Specimen: Nares                  No orders to display              Procedures  Procedures         ED Course  ED Course as of Feb 03 0211   Wed Feb 03, 2021 0206 I attempted to evaluate this patient promptly upon his arrival to the ER    He walked independently and without any difficulty to room 6 where he was greeted pleasantly by his nurse Dar Kelly came inform me that the pt was refusing IV and blood work  He understood that COVID test was ordered, and was agitated that the door was being closed to his room  When I tried to interview the pt he insisted that the television be turned on  I tried to let him know that I needed to properly evaluate him first   He became very upset and I informed him that w/o a proper interview and exam I cannot evaluate his complaints  He demanded to leave and Eloped  As he left he was in no distress and appeared well                                              MDM    Disposition  Final diagnoses:   None     ED Disposition     None      Follow-up Information    None         Patient's Medications   Discharge Prescriptions    No medications on file     No discharge procedures on file      PDMP Review     None          ED Provider  Electronically Signed by           Wilmer Gallo MD  02/03/21 9428

## 2021-02-03 NOTE — ED NOTES
During triage patient had an aggressive outburst "you better open that fucking door", When told I was going to start and IV, patient flat out refused, stated "I see no need for an IV" Dr Kati Maravilla informed, agreed to straight stick for blood work  Patient immediately demanded he watch TV while I draw blood  Before I could hand him the TV remote, patient had another aggressive outburst and told me I needed to open the door to stick him for blood  I told patient I couldn't do that for patient safety because he was R/O for COVID-19  I immediately left the room and informed Dr Kati Maravilla of the change in plan of care  Dr Kati Maravilla had a brief conversation with patient before the patient screamed and demanded he be able to watch TV while the Dr  assessed him        Walter Pedro RN  02/03/21 2457

## 2021-06-17 ENCOUNTER — OFFICE VISIT (OUTPATIENT)
Dept: URGENT CARE | Facility: CLINIC | Age: 51
End: 2021-06-17
Payer: COMMERCIAL

## 2021-06-17 ENCOUNTER — APPOINTMENT (OUTPATIENT)
Dept: RADIOLOGY | Facility: CLINIC | Age: 51
End: 2021-06-17
Payer: COMMERCIAL

## 2021-06-17 VITALS
SYSTOLIC BLOOD PRESSURE: 179 MMHG | DIASTOLIC BLOOD PRESSURE: 96 MMHG | HEIGHT: 72 IN | HEART RATE: 74 BPM | OXYGEN SATURATION: 97 % | RESPIRATION RATE: 14 BRPM | TEMPERATURE: 97.4 F | BODY MASS INDEX: 29.39 KG/M2 | WEIGHT: 217 LBS

## 2021-06-17 DIAGNOSIS — S99.912A INJURY OF LEFT ANKLE, INITIAL ENCOUNTER: ICD-10-CM

## 2021-06-17 DIAGNOSIS — S99.912A INJURY OF LEFT ANKLE, INITIAL ENCOUNTER: Primary | ICD-10-CM

## 2021-06-17 PROCEDURE — 99213 OFFICE O/P EST LOW 20 MIN: CPT | Performed by: NURSE PRACTITIONER

## 2021-06-17 PROCEDURE — 73610 X-RAY EXAM OF ANKLE: CPT

## 2021-06-17 NOTE — PATIENT INSTRUCTIONS
Rest, ice, elevate  Wear ace wrap as directed  Tylenol as needed for pain  If you develop any increased pain, swelling, numbness, tingling, or any new or concerning symptoms please return or proceed to ER  Follow up with pcp in 3-5 days  If symptoms persist, please follow up with orthopedics  Ankle Sprain   WHAT YOU NEED TO KNOW:   An ankle sprain happens when 1 or more ligaments in your ankle joint stretch or tear  Ligaments are tough tissues that connect bones  Ligaments support your joints and keep your bones in place  DISCHARGE INSTRUCTIONS:   Return to the emergency department if:   · You have severe pain in your ankle  · Your foot or toes are cold or numb  · Your ankle becomes more weak or unstable (wobbly)  · You are unable to put any weight on your ankle or foot  · Your swelling has increased or returned  Call your doctor if:   · Your pain does not go away, even after treatment  · You have questions or concerns about your condition or care  Medicines: You may need any of the following:  · NSAIDs , such as ibuprofen, help decrease swelling, pain, and fever  This medicine is available with or without a doctor's order  NSAIDs can cause stomach bleeding or kidney problems in certain people  If you take blood thinner medicine, always ask your healthcare provider if NSAIDs are safe for you  Always read the medicine label and follow directions  · Acetaminophen  decreases pain and fever  It is available without a doctor's order  Ask how much to take and how often to take it  Follow directions  Read the labels of all other medicines you are using to see if they also contain acetaminophen, or ask your doctor or pharmacist  Acetaminophen can cause liver damage if not taken correctly  Do not use more than 4 grams (4,000 milligrams) total of acetaminophen in one day  · Prescription pain medicine  may be given  Ask your healthcare provider how to take this medicine safely   Some prescription pain medicines contain acetaminophen  Do not take other medicines that contain acetaminophen without talking to your healthcare provider  Too much acetaminophen may cause liver damage  Prescription pain medicine may cause constipation  Ask your healthcare provider how to prevent or treat constipation  · Take your medicine as directed  Contact your healthcare provider if you think your medicine is not helping or if you have side effects  Tell him or her if you are allergic to any medicine  Keep a list of the medicines, vitamins, and herbs you take  Include the amounts, and when and why you take them  Bring the list or the pill bottles to follow-up visits  Carry your medicine list with you in case of an emergency  Self-care:   · Use support devices,  such as a brace, cast, or splint, to limit your movement and protect your joint  You may need to use crutches to decrease your pain as you move around  · Go to physical therapy as directed  A physical therapist teaches you exercises to help improve movement and strength, and to decrease pain  · Rest  your ankle so that it can heal  Return to normal activities as directed  · Apply ice  on your ankle for 15 to 20 minutes every hour or as directed  Use an ice pack, or put crushed ice in a plastic bag  Cover it with a towel  Ice helps prevent tissue damage and decreases swelling and pain  · Compress  your ankle  Ask if you should wrap an elastic bandage around your injured ligament  An elastic bandage provides support and helps decrease swelling and movement so your joint can heal  Wear as long as directed  · Elevate  your ankle above the level of your heart as often as you can  This will help decrease swelling and pain  Prop your ankle on pillows or blankets to keep it elevated comfortably         Prevent another ankle sprain:   · Let your ankle heal   Find out how long your ligament needs to heal  Do not do any physical activity until your healthcare provider says it is okay  If you start activity too soon, you may develop a more serious injury  · Always warm up and stretch  before you exercise or play sports  · Use the right equipment  Always wear shoes that fit well and are made for the activity that you are doing  You may also need ankle supports, elbow and knee pads, or braces  Follow up with your doctor as directed:  Write down your questions so you remember to ask them during your visits  © Copyright [address] Information is for End User's use only and may not be sold, redistributed or otherwise used for commercial purposes  All illustrations and images included in CareNotes® are the copyrighted property of A D A M , Inc  or [address] [place], [place] [address]pa[facility]  The above information is an  only  It is not intended as medical advice for individual conditions or treatments  Talk to your doctor, nurse or pharmacist before following any medical regimen to see if it is safe and effective for you

## 2021-06-17 NOTE — PROGRESS NOTES
[facility]        NAME: Jennifer Villalobos is a 53 y o  male  : 1970    MRN: [id number]  DATE: 2021  TIME: 11:36 AM    Assessment and Plan   Injury of left ankle, initial encounter [E99 912A]  1  Injury of left ankle, initial encounter  XR ankle 3+ vw left       No acute osseous abnormality per my read  Ace wrap applied  Patient Instructions     Patient Instructions     Rest, ice, elevate  Wear ace wrap as directed  Tylenol as needed for pain  If you develop any increased pain, swelling, numbness, tingling, or any new or concerning symptoms please return or proceed to ER  Follow up with pcp in 3-5 days  If symptoms persist, please follow up with orthopedics  Ankle Sprain   WHAT YOU NEED TO KNOW:   An ankle sprain happens when 1 or more ligaments in your ankle joint stretch or tear  Ligaments are tough tissues that connect bones  Ligaments support your joints and keep your bones in place  DISCHARGE INSTRUCTIONS:   Return to the emergency department if:   · You have severe pain in your ankle  · Your foot or toes are cold or numb  · Your ankle becomes more weak or unstable (wobbly)  · You are unable to put any weight on your ankle or foot  · Your swelling has increased or returned  Call your doctor if:   · Your pain does not go away, even after treatment  · You have questions or concerns about your condition or care  Medicines: You may need any of the following:  · NSAIDs , such as ibuprofen, help decrease swelling, pain, and fever  This medicine is available with or without a doctor's order  NSAIDs can cause stomach bleeding or kidney problems in certain people  If you take blood thinner medicine, always ask your healthcare provider if NSAIDs are safe for you  Always read the medicine label and follow directions  · Acetaminophen  decreases pain and fever  It is available without a doctor's order  Ask how much to take and how often to take it   Follow directions  Read the labels of all other medicines you are using to see if they also contain acetaminophen, or ask your doctor or pharmacist  Acetaminophen can cause liver damage if not taken correctly  Do not use more than 4 grams (4,000 milligrams) total of acetaminophen in one day  · Prescription pain medicine  may be given  Ask your healthcare provider how to take this medicine safely  Some prescription pain medicines contain acetaminophen  Do not take other medicines that contain acetaminophen without talking to your healthcare provider  Too much acetaminophen may cause liver damage  Prescription pain medicine may cause constipation  Ask your healthcare provider how to prevent or treat constipation  · Take your medicine as directed  Contact your healthcare provider if you think your medicine is not helping or if you have side effects  Tell him or her if you are allergic to any medicine  Keep a list of the medicines, vitamins, and herbs you take  Include the amounts, and when and why you take them  Bring the list or the pill bottles to follow-up visits  Carry your medicine list with you in case of an emergency  Self-care:   · Use support devices,  such as a brace, cast, or splint, to limit your movement and protect your joint  You may need to use crutches to decrease your pain as you move around  · Go to physical therapy as directed  A physical therapist teaches you exercises to help improve movement and strength, and to decrease pain  · Rest  your ankle so that it can heal  Return to normal activities as directed  · Apply ice  on your ankle for 15 to 20 minutes every hour or as directed  Use an ice pack, or put crushed ice in a plastic bag  Cover it with a towel  Ice helps prevent tissue damage and decreases swelling and pain  · Compress  your ankle  Ask if you should wrap an elastic bandage around your injured ligament   An elastic bandage provides support and helps decrease swelling and movement so your joint can heal  Wear as long as directed  · Elevate  your ankle above the level of your heart as often as you can  This will help decrease swelling and pain  Prop your ankle on pillows or blankets to keep it elevated comfortably  Prevent another ankle sprain:   · Let your ankle heal   Find out how long your ligament needs to heal  Do not do any physical activity until your healthcare provider says it is okay  If you start activity too soon, you may develop a more serious injury  · Always warm up and stretch  before you exercise or play sports  · Use the right equipment  Always wear shoes that fit well and are made for the activity that you are doing  You may also need ankle supports, elbow and knee pads, or braces  Follow up with your doctor as directed:  Write down your questions so you remember to ask them during your visits  © Copyright [facility] [address] Information is for End User's use only and may not be sold, redistributed or otherwise used for commercial purposes  All illustrations and images included in CareNotes® are the copyrighted property of A D A M , Inc  or [address] [place], [place] [address]paD[facility]  The above information is an  only  It is not intended as medical advice for individual conditions or treatments  Talk to your doctor, nurse or pharmacist before following any medical regimen to see if it is safe and effective for you  Follow up with PCP in 3-5 days  Proceed to  ER if symptoms worsen  Chief Complaint     Chief Complaint   Patient presents with    Pain     2 days ago, hopped off trailer and rolled left ankle  Applying ice  Some pain worse when walking 6/10 pain at worst  Left medial ankle appears swollen with some redness  History of Present Illness         Patient is a 60-year-old male who presents with a 2 day history of left ankle pain  Patient states that 2 days ago he jumped off of a trailer and rolled his left ankle   Did not fall to the ground  Patient states he has been elevating applying ice with mild relief  Complaining of increased pain with ambulation and better at rest   Denies any previous injury or trauma  Denies any numbness, tingling, or weakness of extremity  Review of Systems   Review of Systems   Constitutional: Negative for chills, diaphoresis, fatigue and fever  HENT: Negative  Respiratory: Negative  Cardiovascular: Negative  Musculoskeletal: Positive for arthralgias, gait problem and joint swelling  Negative for back pain, myalgias, neck pain and neck stiffness  Skin: Negative for wound  Neurological: Negative for weakness and numbness  Current Medications       Current Outpatient Medications:     albuterol (PROVENTIL HFA,VENTOLIN HFA) 90 mcg/act inhaler, Inhale 2 puffs every 6 (six) hours as needed for wheezing, Disp: , Rfl:     lisinopril (ZESTRIL) 10 mg tablet, Take 10 mg by mouth daily, Disp: , Rfl:     omeprazole (PriLOSEC) 20 mg delayed release capsule, Take 20 mg by mouth daily, Disp: , Rfl:     tobramycin (TOBREX) 0 3 % OINT, Administer 0 5 inches to the right eye 3 (three) times a day (Patient not taking: Reported on 6/16/2019), Disp: 3 5 g, Rfl: 0    Current Allergies     Allergies as of 06/17/2021 - Reviewed 06/17/2021   Allergen Reaction Noted    Buspar [buspirone] Anxiety 02/03/2021            The following portions of the patient's history were reviewed and updated as appropriate: allergies, current medications, past family history, past medical history, past social history, past surgical history and problem list      Past Medical History:   Diagnosis Date    Hypertension     Stomach ulcer        Past Surgical History:   Procedure Laterality Date    HERNIA REPAIR      NO PAST SURGERIES         History reviewed  No pertinent family history  Medications have been verified          Objective   BP (!) 179/96   Pulse 74   Temp (!) 97 4 °F (36 3 °C)   Resp 14   Ht 6' (1 829 m)   Wt 98 4 kg (217 lb)   SpO2 97%   BMI 29 43 kg/m²   No LMP for male patient  Physical Exam     Physical Exam  Constitutional:       General: He is not in acute distress  Appearance: Normal appearance  He is not diaphoretic  Cardiovascular:      Rate and Rhythm: Normal rate and regular rhythm  Pulses: Normal pulses  Dorsalis pedis pulses are 2+ on the right side and 2+ on the left side  Heart sounds: Normal heart sounds, S1 normal and S2 normal    Pulmonary:      Effort: Pulmonary effort is normal       Breath sounds: Normal breath sounds and air entry  Musculoskeletal:      Left ankle: Swelling and ecchymosis present  No deformity  Tenderness present over the lateral malleolus  No medial malleolus, ATF ligament, AITF ligament, CF ligament, posterior TF ligament, base of 5th metatarsal or proximal fibula tenderness  Normal range of motion  Skin:     General: Skin is warm and dry  Capillary Refill: Capillary refill takes less than 2 seconds  Neurological:      Mental Status: He is alert

## 2021-06-17 NOTE — LETTER
June 17, 2021     Patient: Kenia Phillips   YOB: 1970   Date of Visit: 6/17/2021       To Whom it May Concern:    Anika Prudencio was seen in my clinic on 6/17/2021  He may return to work on 6/21/2021  If you have any questions or concerns, please don't hesitate to call           Sincerely,          BONY Crenshaw        CC: No Recipients

## 2021-06-27 ENCOUNTER — HOSPITAL ENCOUNTER (EMERGENCY)
Facility: HOSPITAL | Age: 51
Discharge: HOME | End: 2021-06-27
Attending: EMERGENCY MEDICINE | Admitting: EMERGENCY MEDICINE
Payer: COMMERCIAL

## 2021-06-27 VITALS
SYSTOLIC BLOOD PRESSURE: 167 MMHG | HEART RATE: 64 BPM | DIASTOLIC BLOOD PRESSURE: 87 MMHG | RESPIRATION RATE: 16 BRPM | TEMPERATURE: 98.4 F | WEIGHT: 215 LBS | OXYGEN SATURATION: 97 % | BODY MASS INDEX: 29.16 KG/M2

## 2021-06-27 DIAGNOSIS — S05.00XA CORNEAL ABRASION: Primary | ICD-10-CM

## 2021-06-27 DIAGNOSIS — T15.00XA CORNEAL FOREIGN BODY: ICD-10-CM

## 2021-06-27 PROCEDURE — 99283 EMERGENCY DEPT VISIT LOW MDM: CPT

## 2021-06-27 PROCEDURE — 99284 EMERGENCY DEPT VISIT MOD MDM: CPT | Performed by: EMERGENCY MEDICINE

## 2021-06-27 PROCEDURE — 65205 REMOVE FOREIGN BODY FROM EYE: CPT | Performed by: EMERGENCY MEDICINE

## 2021-06-27 RX ORDER — TETRACAINE HYDROCHLORIDE 5 MG/ML
1 SOLUTION OPHTHALMIC ONCE
Status: COMPLETED | OUTPATIENT
Start: 2021-06-27 | End: 2021-06-27

## 2021-06-27 RX ORDER — CIPROFLOXACIN HYDROCHLORIDE 3.5 MG/ML
2 SOLUTION/ DROPS TOPICAL EVERY 4 HOURS
Qty: 5 ML | Refills: 0 | Status: SHIPPED | OUTPATIENT
Start: 2021-06-27

## 2021-06-27 RX ORDER — KETOROLAC TROMETHAMINE 5 MG/ML
1 SOLUTION OPHTHALMIC 4 TIMES DAILY
Status: DISCONTINUED | OUTPATIENT
Start: 2021-06-27 | End: 2021-06-28 | Stop reason: HOSPADM

## 2021-06-27 RX ORDER — CIPROFLOXACIN HYDROCHLORIDE 3.5 MG/ML
2 SOLUTION/ DROPS TOPICAL ONCE
Status: COMPLETED | OUTPATIENT
Start: 2021-06-27 | End: 2021-06-27

## 2021-06-27 RX ORDER — IMATINIB MESYLATE 400 MG/1
TABLET, FILM COATED ORAL
COMMUNITY
Start: 2021-04-22

## 2021-06-27 RX ADMIN — TETRACAINE HYDROCHLORIDE 1 DROP: 5 SOLUTION OPHTHALMIC at 23:00

## 2021-06-27 RX ADMIN — KETOROLAC TROMETHAMINE 1 DROP: 5 SOLUTION OPHTHALMIC at 23:25

## 2021-06-27 RX ADMIN — FLUORESCEIN SODIUM 1 STRIP: 1 STRIP OPHTHALMIC at 23:00

## 2021-06-27 RX ADMIN — CIPROFLOXACIN HYDROCHLORIDE 2 DROP: 3 SOLUTION/ DROPS OPHTHALMIC at 23:26

## 2021-06-28 NOTE — ED PROVIDER NOTES
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 and who has got a piece of per light in his eye  CT or gated copiously however had continued foreign body sensation over last 2 days  No visual changes  No contacts  Foreign Body in Eye  Location:  R eye  Suspected object: perlite  Pain quality:  Dull  Pain severity:  Mild  Duration:  2 days  Timing:  Constant  Progression:  Unchanged  Chronicity:  New  Worsened by:  Nothing  Ineffective treatments:  Flushing with water  Associated symptoms: no abdominal pain, no congestion, no cough, no nausea, no nosebleeds, no rhinorrhea, no sore throat and no vomiting        Prior to Admission Medications   Prescriptions Last Dose Informant Patient Reported? Taking? albuterol (PROVENTIL HFA,VENTOLIN HFA) 90 mcg/act inhaler   Yes Yes   Sig: Inhale 2 puffs every 6 (six) hours as needed for wheezing   imatinib (GLEEVEC) 400 mg tablet   Yes Yes   lisinopril (ZESTRIL) 10 mg tablet   Yes Yes   Sig: Take 10 mg by mouth daily   omeprazole (PriLOSEC) 20 mg delayed release capsule   Yes Yes   Sig: Take 20 mg by mouth daily      Facility-Administered Medications: None       Past Medical History:   Diagnosis Date    Hypertension     Stomach ulcer        Past Surgical History:   Procedure Laterality Date    HERNIA REPAIR      NO PAST SURGERIES         History reviewed  No pertinent family history  I have reviewed and agree with the history as documented      E-Cigarette/Vaping    E-Cigarette Use Never User      E-Cigarette/Vaping Substances     Social History     Tobacco Use    Smoking status: Current Every Day Smoker     Packs/day: 1 00    Smokeless tobacco: Never Used   Vaping Use    Vaping Use: Never used   Substance Use Topics    Alcohol use: Yes     Comment: occasionally    Drug use: No       Review of Systems   Constitutional: Negative for chills and fever  HENT: Negative for congestion, nosebleeds, rhinorrhea and sore throat  Eyes: Negative for pain and visual disturbance  Respiratory: Negative for cough and wheezing  Cardiovascular: Negative for chest pain and leg swelling  Gastrointestinal: Negative for abdominal distention, abdominal pain, diarrhea, nausea and vomiting  Genitourinary: Negative for dysuria and frequency  Musculoskeletal: Negative for back pain and joint swelling  Skin: Negative for rash and wound  Neurological: Negative for weakness and numbness  Psychiatric/Behavioral: Negative for decreased concentration and suicidal ideas  Physical Exam  Physical Exam  Vitals and nursing note reviewed  Constitutional:       General: He is not in acute distress  Appearance: Normal appearance  HENT:      Head: Normocephalic and atraumatic  Nose: Nose normal    Eyes:      General:         Right eye: No discharge  Left eye: No discharge  Pupils:      Right eye: Corneal abrasion present  Comments: Foreign body 6 o clock position of right cornea with surrounding corneal abrasion   Cardiovascular:      Rate and Rhythm: Normal rate and regular rhythm  Pulmonary:      Effort: Pulmonary effort is normal  No respiratory distress  Abdominal:      General: Abdomen is flat  There is no distension  Musculoskeletal:         General: No deformity  Normal range of motion  Skin:     General: Skin is warm  Findings: No rash  Neurological:      Mental Status: He is alert and oriented to person, place, and time  Motor: No weakness     Psychiatric:         Mood and Affect: Mood normal          Behavior: Behavior normal          Vital Signs  ED Triage Vitals [06/27/21 2251]   Temperature Pulse Respirations Blood Pressure SpO2   98 4 °F (36 9 °C) 64 16 167/87 97 %      Temp src Heart Rate Source Patient Position - Orthostatic VS BP Location FiO2 (%)   -- -- -- -- --      Pain Score       5           Vitals:    06/27/21 2251   BP: 167/87   Pulse: 64         Visual Acuity  Visual Acuity      Most Recent Value   Visual acuity R eye is  20/20   Visual acuity Left eye is  20/20   Visual acuity in both eyes is  20/20   Wearing corrective eyewear/lenses? Yes          ED Medications  Medications   ketorolac (ACULAR) 0 5 % ophthalmic solution 1 drop (1 drop Right Eye Given 6/27/21 2325)   fluorescein sodium sterile ophthalmic strip 1 strip (1 strip Left Eye Given 6/27/21 2300)   tetracaine 0 5 % ophthalmic solution 1 drop (1 drop Left Eye Given 6/27/21 2300)   ciprofloxacin (CILOXAN) 0 3 % ophthalmic solution 2 drop (2 drops Right Eye Given 6/27/21 2326)       Diagnostic Studies  Results Reviewed     None                 No orders to display              Procedures  Foreign Body - Embedded    Date/Time: 6/27/2021 11:32 PM  Performed by: Lindajo Gowers, DO  Authorized by: Lindajo Gowers, DO     Patient location:  ED  Consent:     Consent obtained:  Verbal    Consent given by:  Patient  Universal protocol:     Procedure explained and questions answered to patient or proxy's satisfaction: yes      Patient identity confirmed:  Verbally with patient  Location:     Location: right eye  Depth: corneal     Tendon involvement:  None  Pre-procedure details:     Imaging:  None  Anesthesia (see MAR for exact dosages): Anesthesia method:  Topical application    Topical anesthesia: tetracaine  Procedure details:     Removal mechanism: 18 guage calculator  Procedure complexity:  Simple    Foreign bodies recovered:  1    Intact foreign body removal: yes    Post-procedure details:     Confirmation:  No additional foreign bodies on visualization    Skin closure:  None    Patient tolerance of procedure:   Tolerated well, no immediate complications             ED Course                             SBIRT 20yo+      Most Recent Value   SBIRT (24 yo +)   In order to provide better care to our patients, we are screening all of our patients for alcohol and drug use  Would it be okay to ask you these screening questions? Yes Filed at: 06/27/2021 2257   Initial Alcohol Screen: US AUDIT-C    1  How often do you have a drink containing alcohol? 3 Filed at: 06/27/2021 2257   2  How many drinks containing alcohol do you have on a typical day you are drinking? 2 Filed at: 06/27/2021 2257   3a  Male UNDER 65: How often do you have five or more drinks on one occasion? 3 Filed at: 06/27/2021 2257   3b  FEMALE Any Age, or MALE 65+: How often do you have 4 or more drinks on one occassion? 0 Filed at: 06/27/2021 2257   Audit-C Score  (!) 8 Filed at: 06/27/2021 2257   Full Alcohol Screen: US AUDIT   4  How often during the last year have you found that you were not able to stop drinking once you had started? 0 Filed at: 06/27/2021 2302   5  How often during past year have you failed to do what was normally expected of you because of drinking? 0 Filed at: 06/27/2021 2302   6  How often in past year have you needed a first drink in the morning to get yourself going after a heavy drinking session? 0 Filed at: 06/27/2021 2302   7  How often in past year have you had feeling of guilt or remorse after drinking? 0 Filed at: 06/27/2021 2302   8  How often in past year have you been unable to remember what happened night before because you had been drinking? 0 Filed at: 06/27/2021 2302   9  Have you or someone else been injured as a result of your drinking? 0 Filed at: 06/27/2021 2302   10  Has a relative, friend, doctor or other health worker been concerned about your drinking and suggested you cut down?   0 Filed at: 06/27/2021 2302   MERVIN: How many times in the past year have you    Used an illegal drug or used a prescription medication for non-medical reasons?   Never Filed at: 06/27/2021 2257                    MDM  Number of Diagnoses or Management Options  Corneal abrasion: new and requires workup  Corneal foreign body: new and requires workup  Diagnosis management comments: Patient is a 56-year-old male with foreign body to the right eye, imbedded in removed easily with a 18 gauge catheter  Mild corneal abrasion, will start antibiotics  Intra-ocular pressure reliably measured at 13 mm Hg       Amount and/or Complexity of Data Reviewed  Review and summarize past medical records: yes    Risk of Complications, Morbidity, and/or Mortality  Presenting problems: moderate  Diagnostic procedures: minimal  Management options: high        Disposition  Final diagnoses:   Corneal abrasion   Corneal foreign body     Time reflects when diagnosis was documented in both MDM as applicable and the Disposition within this note     Time User Action Codes Description Comment    6/27/2021 11:14 PM Justus Rodriguez Add [S05 00XA] Corneal abrasion     6/27/2021 11:14 PM Justus Rodriguez Add [T15 00XA] Corneal foreign body       ED Disposition     ED Disposition Condition Date/Time Comment    Discharge Stable [place] Jun 27, 2021 11:15 PM Moriah Nina discharge to home/self care  Follow-up Information     Follow up With Specialties Details Why Maximiliano Ochoa MD Ophthalmology Schedule an appointment as soon as possible for a visit   [address]  [facility] [address]  [phone]            Discharge Medication List as of 6/27/2021 11:24 PM      START taking these medications    Details   ciprofloxacin (CILOXAN) 0 3 % ophthalmic solution Administer 2 drops to the right eye every 4 (four) hours, Starting Sun 6/27/2021, Normal         CONTINUE these medications which have NOT CHANGED    Details   albuterol (PROVENTIL HFA,VENTOLIN HFA) 90 mcg/act inhaler Inhale 2 puffs every 6 (six) hours as needed for wheezing, Historical Med      imatinib (GLEEVEC) 400 mg tablet Starting Thu 4/22/2021, Historical Med      lisinopril (ZESTRIL) 10 mg tablet Take 10 mg by mouth daily, Historical Med omeprazole (PriLOSEC) 20 mg delayed release capsule Take 20 mg by mouth daily, Historical Med           No discharge procedures on file      PDMP Review     None          ED Provider  Electronically Signed by           Lindajo Gowers, DO  06/27/21 0377

## 2021-07-27 ENCOUNTER — OFFICE VISIT (OUTPATIENT)
Dept: URGENT CARE | Facility: CLINIC | Age: 51
End: 2021-07-27
Payer: COMMERCIAL

## 2021-07-27 VITALS
RESPIRATION RATE: 18 BRPM | DIASTOLIC BLOOD PRESSURE: 86 MMHG | OXYGEN SATURATION: 96 % | HEART RATE: 75 BPM | SYSTOLIC BLOOD PRESSURE: 161 MMHG | TEMPERATURE: 97.4 F

## 2021-07-27 DIAGNOSIS — R11.0 NAUSEA: Primary | ICD-10-CM

## 2021-07-27 LAB
SL AMB  POCT GLUCOSE, UA: NORMAL
SL AMB LEUKOCYTE ESTERASE,UA: NORMAL
SL AMB POCT BILIRUBIN,UA: NORMAL
SL AMB POCT BLOOD,UA: NORMAL
SL AMB POCT CLARITY,UA: CLEAR
SL AMB POCT COLOR,UA: YELLOW
SL AMB POCT KETONES,UA: NORMAL
SL AMB POCT NITRITE,UA: NORMAL
SL AMB POCT PH,UA: 6
SL AMB POCT SPECIFIC GRAVITY,UA: 1.02
SL AMB POCT URINE PROTEIN: NORMAL
SL AMB POCT UROBILINOGEN: 0.2

## 2021-07-27 PROCEDURE — 81002 URINALYSIS NONAUTO W/O SCOPE: CPT | Performed by: NURSE PRACTITIONER

## 2021-07-27 PROCEDURE — 99213 OFFICE O/P EST LOW 20 MIN: CPT | Performed by: NURSE PRACTITIONER

## 2021-07-27 RX ORDER — OMEPRAZOLE 20 MG/1
20 CAPSULE, DELAYED RELEASE ORAL DAILY
Qty: 30 CAPSULE | Refills: 0 | Status: SHIPPED | OUTPATIENT
Start: 2021-07-27

## 2021-07-27 NOTE — PROGRESS NOTES
[email or web address]        NAME: Humza Verduzco is a 49 y o  male  : 1970    MRN: [id number]  DATE: 2021  TIME: 1:54 PM    Assessment and Plan   Nausea [R11 0]  1  Nausea  POCT urine dip    omeprazole (PriLOSEC) 20 mg delayed release capsule         Patient Instructions     Patient Instructions      recommend over-the-counter Prilosec  If he develops any abdominal pain, vomiting, diarrhea, blood or mucus in stool, urinary symptoms, fever, chest pain, shortness of breath, any new or concerning symptoms please proceed ER  Please follow-up with GI  Advised follow-up with PCP in 2-3 days    Acute Nausea and Vomiting   WHAT YOU NEED TO KNOW:   Acute nausea and vomiting start suddenly, worsen quickly, and last a short time  DISCHARGE INSTRUCTIONS:   Return to the emergency department if:   · You see blood in your vomit or your bowel movements  · You have sudden, severe pain in your chest and upper abdomen after hard vomiting or retching  · You have swelling in your neck and chest      · You are dizzy, cold, and thirsty and your eyes and mouth are dry  · You are urinating very little or not at all  · You have muscle weakness, leg cramps, and trouble breathing  · Your heart is beating much faster than normal      · You continue to vomit for more than 48 hours  Contact your healthcare provider if:   · You have frequent dry heaves (vomiting but nothing comes out)  · Your nausea and vomiting does not get better or go away after you use medicine  · You have questions or concerns about your condition or treatment  Medicines: You may need any of the following:  · Medicines  may be given to calm your stomach and stop your vomiting  You may also need medicines to help you feel more relaxed or to stop nausea and vomiting caused by motion sickness  · Gastrointestinal stimulants  are used to help empty your stomach and bowels  This may help decrease nausea and vomiting      · Take your medicine as directed  Contact your healthcare provider if you think your medicine is not helping or if you have side effects  Tell him or her if you are allergic to any medicine  Keep a list of the medicines, vitamins, and herbs you take  Include the amounts, and when and why you take them  Bring the list or the pill bottles to follow-up visits  Carry your medicine list with you in case of an emergency  Prevent or manage acute nausea and vomiting:   · Do not drink alcohol  Alcohol may upset or irritate your stomach  Too much alcohol can also cause acute nausea and vomiting  · Control stress  Headaches due to stress may cause nausea and vomiting  Find ways to relax and manage your stress  Get more rest and sleep  · Drink more liquids as directed  Vomiting can lead to dehydration  It is important to drink more liquids to help replace lost body fluids  Ask your healthcare provider how much liquid to drink each day and which liquids are best for you  Your provider may recommend that you drink an oral rehydration solution (ORS)  ORS contains water, salts, and sugar that are needed to replace the lost body fluids  Ask what kind of ORS to use, how much to drink, and where to get it  · Eat smaller meals, more often  Eat small amounts of food every 2 to 3 hours, even if you are not hungry  Food in your stomach may decrease your nausea  · Talk to your healthcare provider before you take over-the-counter (OTC) medicines  These medicines can cause serious problems if you use certain other medicines, or you have a medical condition  You may have problems if you use too much or use them for longer than the label says  Follow directions on the label carefully  Follow up with your healthcare provider as directed:  Write down your questions so you remember to ask them during your follow-up visits    © Copyright [employer] 2021 Information is for End User's use only and may not be sold, redistributed or otherwise used for commercial purposes  All illustrations and images included in CareNotes® are the copyrighted property of A D A M [employer] Inc  or Lilian Valladares  The above information is an  only  It is not intended as medical advice for individual conditions or treatments  Talk to your doctor, nurse or pharmacist before following any medical regimen to see if it is safe and effective for you  Follow up with PCP in 3-5 days  Proceed to  ER if symptoms worsen  Chief Complaint     Chief Complaint   Patient presents with    Abdominal Pain     Since Sunday, up set stomach  No vomiting, loose stool  History of Present Illness        Patient is a 78-year-old male who presents with a 2 day history of nausea  Patient states that symptoms occur after eating  Denies any difficulty eating or drinking  Denies any decreased appetite or fluid intake  Patient states that was taking daily Prilosec but that he ran out of his prescription and did not get it refilled  Patient denies any abdominal pain, vomiting, diarrhea  Denies any urinary symptoms  Denies any chest pain or shortness of breath, or palpitations  Denies any fever chills or  body aches  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, states he does follow up with GI and had a normal CT scan 1 month ago  Review of Systems   Review of Systems   Constitutional: Negative for chills, diaphoresis, fatigue and fever  HENT: Negative  Respiratory: Negative for cough, chest tightness, shortness of breath, wheezing and stridor  Cardiovascular: Negative for chest pain and palpitations  Gastrointestinal: Positive for nausea  Negative for abdominal distention, abdominal pain, anal bleeding, blood in stool, constipation, diarrhea, rectal pain and vomiting  Genitourinary: Negative for decreased urine volume, difficulty urinating, dysuria, flank pain, frequency, hematuria and urgency     Musculoskeletal: Negative for arthralgias, back pain, joint swelling, myalgias, neck pain and neck stiffness  Skin: Negative for rash  Neurological: Negative for dizziness, syncope, weakness, light-headedness, numbness and headaches  Current Medications       Current Outpatient Medications:     albuterol (PROVENTIL HFA,VENTOLIN HFA) 90 mcg/act inhaler, Inhale 2 puffs every 6 (six) hours as needed for wheezing, Disp: , Rfl:     imatinib (GLEEVEC) 400 mg tablet, , Disp: , Rfl:     lisinopril (ZESTRIL) 10 mg tablet, Take 10 mg by mouth daily, Disp: , Rfl:     omeprazole (PriLOSEC) 20 mg delayed release capsule, Take 1 capsule (20 mg total) by mouth daily, Disp: 30 capsule, Rfl: 0    ciprofloxacin (CILOXAN) 0 3 % ophthalmic solution, Administer 2 drops to the right eye every 4 (four) hours (Patient not taking: Reported on 7/27/2021), Disp: 5 mL, Rfl: 0    Current Allergies     Allergies as of 07/27/2021 - Reviewed 07/27/2021   Allergen Reaction Noted    Buspar [buspirone] Anxiety 02/03/2021            The following portions of the patient's history were reviewed and updated as appropriate: allergies, current medications, past family history, past medical history, past social history, past surgical history and problem list      Past Medical History:   Diagnosis Date    Hypertension     Stomach ulcer        Past Surgical History:   Procedure Laterality Date    HERNIA REPAIR      NO PAST SURGERIES         No family history on file  Medications have been verified  Objective   /86   Pulse 75   Temp (!) 97 4 °F (36 3 °C) (Temporal)   Resp 18   SpO2 96%   No LMP for male patient  Physical Exam     Physical Exam  Constitutional:       General: He is not in acute distress  Appearance: He is well-developed  He is not diaphoretic  Cardiovascular:      Rate and Rhythm: Normal rate and regular rhythm  Pulses: Normal pulses        Heart sounds: Normal heart sounds, S1 normal and S2 normal    Pulmonary: Effort: Pulmonary effort is normal       Breath sounds: Normal breath sounds and air entry  Abdominal:      General: Bowel sounds are normal  There is no distension  Palpations: Abdomen is soft  Abdomen is not rigid  There is no shifting dullness or mass  Tenderness: There is no abdominal tenderness  There is no right CVA tenderness, left CVA tenderness, guarding or rebound  Negative signs include Mehta's sign and McBurney's sign  Skin:     General: Skin is warm and dry  Capillary Refill: Capillary refill takes less than 2 seconds  Neurological:      Mental Status: He is alert and oriented to person, place, and time  GCS: GCS eye subscore is 4  GCS verbal subscore is 5  GCS motor subscore is 6

## 2021-07-27 NOTE — PATIENT INSTRUCTIONS
recommend over-the-counter Prilosec  If he develops any abdominal pain, vomiting, diarrhea, blood or mucus in stool, urinary symptoms, fever, chest pain, shortness of breath, any new or concerning symptoms please proceed ER  Please follow-up with GI  Advised follow-up with PCP in 2-3 days    Acute Nausea and Vomiting   WHAT YOU NEED TO KNOW:   Acute nausea and vomiting start suddenly, worsen quickly, and last a short time  DISCHARGE INSTRUCTIONS:   Return to the emergency department if:   · You see blood in your vomit or your bowel movements  · You have sudden, severe pain in your chest and upper abdomen after hard vomiting or retching  · You have swelling in your neck and chest      · You are dizzy, cold, and thirsty and your eyes and mouth are dry  · You are urinating very little or not at all  · You have muscle weakness, leg cramps, and trouble breathing  · Your heart is beating much faster than normal      · You continue to vomit for more than 48 hours  Contact your healthcare provider if:   · You have frequent dry heaves (vomiting but nothing comes out)  · Your nausea and vomiting does not get better or go away after you use medicine  · You have questions or concerns about your condition or treatment  Medicines: You may need any of the following:  · Medicines  may be given to calm your stomach and stop your vomiting  You may also need medicines to help you feel more relaxed or to stop nausea and vomiting caused by motion sickness  · Gastrointestinal stimulants  are used to help empty your stomach and bowels  This may help decrease nausea and vomiting  · Take your medicine as directed  Contact your healthcare provider if you think your medicine is not helping or if you have side effects  Tell him or her if you are allergic to any medicine  Keep a list of the medicines, vitamins, and herbs you take  Include the amounts, and when and why you take them   Bring the list or the pill bottles to follow-up visits  Carry your medicine list with you in case of an emergency  Prevent or manage acute nausea and vomiting:   · Do not drink alcohol  Alcohol may upset or irritate your stomach  Too much alcohol can also cause acute nausea and vomiting  · Control stress  Headaches due to stress may cause nausea and vomiting  Find ways to relax and manage your stress  Get more rest and sleep  · Drink more liquids as directed  Vomiting can lead to dehydration  It is important to drink more liquids to help replace lost body fluids  Ask your healthcare provider how much liquid to drink each day and which liquids are best for you  Your provider may recommend that you drink an oral rehydration solution (ORS)  ORS contains water, salts, and sugar that are needed to replace the lost body fluids  Ask what kind of ORS to use, how much to drink, and where to get it  · Eat smaller meals, more often  Eat small amounts of food every 2 to 3 hours, even if you are not hungry  Food in your stomach may decrease your nausea  · Talk to your healthcare provider before you take over-the-counter (OTC) medicines  These medicines can cause serious problems if you use certain other medicines, or you have a medical condition  You may have problems if you use too much or use them for longer than the label says  Follow directions on the label carefully  Follow up with your healthcare provider as directed:  Write down your questions so you remember to ask them during your follow-up visits  © Copyright [employer] 2021 Information is for End User's use only and may not be sold, redistributed or otherwise used for commercial purposes  All illustrations and images included in CareNotes® are the copyrighted property of [employer]  or Lilian Valladares  The above information is an  only  It is not intended as medical advice for individual conditions or treatments   Talk to your doctor, nurse or pharmacist before following any medical regimen to see if it is safe and effective for you

## 2021-07-27 NOTE — LETTER
July 27, 2021     Patient: Jessica Sparks   YOB: 1970   Date of Visit: 7/27/2021       To Whom it May Concern:    Hayden Harvey was seen in my clinic on 7/27/2021  He may return to work on 7/29/2021  If you have any questions or concerns, please don't hesitate to call           Sincerely,          BONY Higgins        CC: No Recipients

## 2021-12-18 ENCOUNTER — HOSPITAL ENCOUNTER (EMERGENCY)
Facility: HOSPITAL | Age: 51
Discharge: HOME | End: 2021-12-18
Attending: EMERGENCY MEDICINE
Payer: COMMERCIAL

## 2021-12-18 VITALS
TEMPERATURE: 97.7 F | OXYGEN SATURATION: 97 % | RESPIRATION RATE: 20 BRPM | WEIGHT: 225 LBS | DIASTOLIC BLOOD PRESSURE: 75 MMHG | HEART RATE: 67 BPM | BODY MASS INDEX: 30.48 KG/M2 | SYSTOLIC BLOOD PRESSURE: 167 MMHG | HEIGHT: 72 IN

## 2021-12-18 DIAGNOSIS — S05.00XA CORNEAL ABRASION: Primary | ICD-10-CM

## 2021-12-18 DIAGNOSIS — T15.90XA FOREIGN BODY, EYE: ICD-10-CM

## 2021-12-18 PROCEDURE — 65205 REMOVE FOREIGN BODY FROM EYE: CPT | Performed by: EMERGENCY MEDICINE

## 2021-12-18 PROCEDURE — 99283 EMERGENCY DEPT VISIT LOW MDM: CPT

## 2021-12-18 PROCEDURE — 99284 EMERGENCY DEPT VISIT MOD MDM: CPT | Performed by: EMERGENCY MEDICINE

## 2021-12-18 RX ORDER — TETRACAINE HYDROCHLORIDE 5 MG/ML
1 SOLUTION OPHTHALMIC ONCE
Status: COMPLETED | OUTPATIENT
Start: 2021-12-18 | End: 2021-12-18

## 2021-12-18 RX ORDER — ERYTHROMYCIN 5 MG/G
0.5 OINTMENT OPHTHALMIC ONCE
Status: COMPLETED | OUTPATIENT
Start: 2021-12-18 | End: 2021-12-18

## 2021-12-18 RX ORDER — ERYTHROMYCIN 5 MG/G
OINTMENT OPHTHALMIC
Qty: 3.5 G | Refills: 0 | Status: SHIPPED | OUTPATIENT
Start: 2021-12-18

## 2021-12-18 RX ADMIN — ERYTHROMYCIN 0.5 INCH: 5 OINTMENT OPHTHALMIC at 04:55

## 2021-12-18 RX ADMIN — TETRACAINE HYDROCHLORIDE 1 DROP: 5 SOLUTION OPHTHALMIC at 04:36

## 2021-12-18 RX ADMIN — FLUORESCEIN SODIUM 1 STRIP: 1 STRIP OPHTHALMIC at 04:36

## 2022-05-03 ENCOUNTER — HOSPITAL ENCOUNTER (EMERGENCY)
Facility: HOSPITAL | Age: 52
Discharge: HOME | End: 2022-05-03
Attending: EMERGENCY MEDICINE
Payer: OTHER MISCELLANEOUS

## 2022-05-03 VITALS
RESPIRATION RATE: 16 BRPM | BODY MASS INDEX: 32.2 KG/M2 | SYSTOLIC BLOOD PRESSURE: 167 MMHG | HEIGHT: 71 IN | HEART RATE: 78 BPM | WEIGHT: 230 LBS | OXYGEN SATURATION: 97 % | DIASTOLIC BLOOD PRESSURE: 91 MMHG | TEMPERATURE: 97.9 F

## 2022-05-03 DIAGNOSIS — T15.91XA FOREIGN BODY, EYE, RIGHT, INITIAL ENCOUNTER: Primary | ICD-10-CM

## 2022-05-03 PROCEDURE — 99284 EMERGENCY DEPT VISIT MOD MDM: CPT | Performed by: EMERGENCY MEDICINE

## 2022-05-03 PROCEDURE — 99283 EMERGENCY DEPT VISIT LOW MDM: CPT

## 2022-05-03 RX ORDER — CIPROFLOXACIN HYDROCHLORIDE 3.5 MG/ML
2 SOLUTION/ DROPS TOPICAL EVERY 4 HOURS
Qty: 4.2 ML | Refills: 0 | Status: SHIPPED | OUTPATIENT
Start: 2022-05-03 | End: 2022-05-10

## 2022-05-03 RX ORDER — CIPROFLOXACIN HYDROCHLORIDE 3.5 MG/ML
2 SOLUTION/ DROPS TOPICAL ONCE
Status: COMPLETED | OUTPATIENT
Start: 2022-05-03 | End: 2022-05-03

## 2022-05-03 RX ORDER — TETRACAINE HYDROCHLORIDE 5 MG/ML
2 SOLUTION OPHTHALMIC ONCE
Status: COMPLETED | OUTPATIENT
Start: 2022-05-03 | End: 2022-05-03

## 2022-05-03 RX ADMIN — TETRACAINE HYDROCHLORIDE 2 DROP: 5 SOLUTION OPHTHALMIC at 21:30

## 2022-05-03 RX ADMIN — FLUORESCEIN SODIUM 1 STRIP: 1 STRIP OPHTHALMIC at 21:31

## 2022-05-03 RX ADMIN — CIPROFLOXACIN HYDROCHLORIDE 2 DROP: 3 SOLUTION/ DROPS OPHTHALMIC at 21:31

## 2022-05-04 NOTE — DISCHARGE INSTRUCTIONS
RETURN IF WORSE IN ANY WAY:   WORSENING PAIN,   SHORTNESS OF BREATH,   FEVER OR FLU LIKE SYMPTOMS,   OR NEW AND CONCERNING SYMPTOMS SIGNS OR SYMPTOMS      PLEASE CALL YOUR Ophthalmology (EYE DOCTOR) IN THE MORNING TO SET UP FOLLOW UP to be seen TOMORROW

## 2022-05-04 NOTE — ED PROVIDER NOTES
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     STATES THIS STARTED This afternoon after welding    Pain DESCRIBED AS SHARP, mild       ASSOCIATED SYMPTOMS:  HE Denies NAUSEA, VOMITING  Denies headache  DENIES FEVERS/CHILLS      No light sensitivity      INTERVENTIONS: NONE          History provided by:  Patient  Foreign Body in Eye  Location:  R eye  Suspected object:  Metal  Pain quality:  Aching  Pain severity:  Moderate  Worsened by:  Nothing  Ineffective treatments:  None tried  Associated symptoms: no abdominal pain, no cough, no nausea and no vomiting        Prior to Admission Medications   Prescriptions Last Dose Informant Patient Reported? Taking?    albuterol (PROVENTIL HFA,VENTOLIN HFA) 90 mcg/act inhaler   Yes No   Sig: Inhale 2 puffs every 6 (six) hours as needed for wheezing   ciprofloxacin (CILOXAN) 0 3 % ophthalmic solution   No No   Sig: Administer 2 drops to the right eye every 4 (four) hours   Patient not taking: Reported on 7/27/2021   erythromycin (ILOTYCIN) ophthalmic ointment   No No   Sig: Place a 1/2 inch ribbon of ointment into the lower eyelid 4 times a day   imatinib (GLEEVEC) 400 mg tablet   Yes No   lisinopril (ZESTRIL) 10 mg tablet   Yes No   Sig: Take 10 mg by mouth daily   omeprazole (PriLOSEC) 20 mg delayed release capsule   No No   Sig: Take 1 capsule (20 mg total) by mouth daily      Facility-Administered Medications: None       Past Medical History:   Diagnosis Date    Cancer ([facility] [address] )     small intestine    Hypertension     Stomach ulcer        Past Surgical History:   Procedure Laterality Date    CORONARY ARTERY BYPASS GRAFT      HERNIA REPAIR      NO PAST SURGERIES History reviewed  No pertinent family history  I have reviewed and agree with the history as documented  E-Cigarette/Vaping    E-Cigarette Use Never User      E-Cigarette/Vaping Substances     Social History     Tobacco Use    Smoking status: Current Every Day Smoker     Packs/day: 1 00    Smokeless tobacco: Never Used   Vaping Use    Vaping Use: Never used   Substance Use Topics    Alcohol use: Yes     Comment: occasionally    Drug use: No       Review of Systems   Constitutional: Negative for chills, diaphoresis, fatigue and fever  Eyes: Positive for pain (FB sensation, right eye, "feels like something is under the lid")  Negative for photophobia, discharge, redness, itching and visual disturbance  Respiratory: Negative for cough, shortness of breath, wheezing and stridor  Cardiovascular: Negative for chest pain, palpitations and leg swelling  Gastrointestinal: Negative for abdominal pain, blood in stool, nausea and vomiting  Genitourinary: Negative for difficulty urinating, dysuria, flank pain and frequency  Musculoskeletal: Negative for arthralgias, back pain, gait problem, joint swelling, myalgias, neck pain and neck stiffness  Skin: Negative for rash and wound  Neurological: Negative for dizziness, light-headedness and headaches  All other systems reviewed and are negative  Physical Exam  Physical Exam  Constitutional:       General: He is not in acute distress  Appearance: He is well-developed  He is not ill-appearing, toxic-appearing or diaphoretic  HENT:      Head: Normocephalic and atraumatic  Right Ear: External ear normal       Left Ear: External ear normal       Nose: Nose normal    Eyes:      General: No scleral icterus  Right eye: No discharge  Left eye: No discharge  Extraocular Movements: Extraocular movements intact  Pupils: Pupils are equal, round, and reactive to light        Comments: Mild injection, right eye  Small sub millimeter metal fb embedded to the lateral aspect of the right eye    Neck:      Vascular: No JVD  Trachea: No tracheal deviation  Cardiovascular:      Rate and Rhythm: Normal rate and regular rhythm  Pulses: Normal pulses  Heart sounds: Normal heart sounds  No murmur heard  No friction rub  No gallop  Pulmonary:      Effort: Pulmonary effort is normal  No respiratory distress  Breath sounds: Normal breath sounds  No stridor  No wheezing, rhonchi or rales  Chest:      Chest wall: No tenderness  Abdominal:      General: Bowel sounds are normal  There is no distension  Palpations: Abdomen is soft  There is no mass  Tenderness: There is no abdominal tenderness  There is no right CVA tenderness, left CVA tenderness, guarding or rebound  Hernia: No hernia is present  Musculoskeletal:         General: No swelling, tenderness, deformity or signs of injury  Normal range of motion  Cervical back: Normal range of motion and neck supple  No rigidity or tenderness  Right lower leg: No edema  Left lower leg: No edema  Lymphadenopathy:      Cervical: No cervical adenopathy  Skin:     General: Skin is warm  Capillary Refill: Capillary refill takes less than 2 seconds  Coloration: Skin is not jaundiced or pale  Findings: No bruising, erythema, lesion or rash  Neurological:      General: No focal deficit present  Mental Status: He is alert and oriented to person, place, and time  Mental status is at baseline  Cranial Nerves: No cranial nerve deficit  Sensory: No sensory deficit  Motor: No weakness or abnormal muscle tone  Coordination: Coordination normal    Psychiatric:         Mood and Affect: Mood normal          Behavior: Behavior normal          Thought Content:  Thought content normal          Judgment: Judgment normal          Vital Signs  ED Triage Vitals [05/03/22 2058]   Temperature Pulse Respirations Blood Pressure SpO2   97 9 °F (36 6 °C) 82 16 167/91 98 %      Temp Source Heart Rate Source Patient Position - Orthostatic VS BP Location FiO2 (%)   Oral Monitor Sitting Right arm --      Pain Score       1           Vitals:    05/03/22 2058 05/03/22 2100   BP: 167/91 167/91   Pulse: 82 78   Patient Position - Orthostatic VS: Sitting Sitting         Visual Acuity      ED Medications  Medications   tetracaine 0 5 % ophthalmic solution 2 drop (2 drops Right Eye Given by Other 5/3/22 2130)   fluorescein sodium sterile ophthalmic strip 1 strip (1 strip Right Eye Given by Other 5/3/22 2131)   ciprofloxacin (CILOXAN) 0 3 % ophthalmic solution 2 drop (2 drops Right Eye Given 5/3/22 2131)       Diagnostic Studies  Results Reviewed     None                 No orders to display              Procedures  Procedures         ED Course  ED Course as of 05/04/22 0304   Tue May 03, 2022   2150 Partial removal of FB  Will start on cipro eye drops and have pt f/u w/ Optho   Pt happy w plan  Understands return precautions                               SBIRT 20yo+      Most Recent Value   SBIRT (22 yo +)    In order to provide better care to our patients, we are screening all of our patients for alcohol and drug use  Would it be okay to ask you these screening questions? No Filed at: 05/03/2022 2135                    MDM    Disposition  Final diagnoses:   Foreign body, eye, right, initial encounter     Time reflects when diagnosis was documented in both MDM as applicable and the Disposition within this note     Time User Action Codes Description Comment    5/3/2022  9:51 PM Pearl Galarza Foreign body, eye, right, initial encounter       ED Disposition     ED Disposition Condition Date/Time Comment    Discharge Stable Tue May 3, 2022  9:51 PM Kiran Campbell discharge to home/self care              Follow-up Information     Follow up With Specialties Details Why [address] [facility] [address], DO Family Medicine Call today [address]      Marian Mccormick MD Ophthalmology Call today  [phone] [address] [facility] Filiberto Cordova MD Ophthalmology Call today  Román Dixon   [address]  [phone]            Discharge Medication List as of 5/3/2022 10:19 PM      START taking these medications    Details   !! ciprofloxacin (CILOXAN) 0 3 % ophthalmic solution Administer 2 drops to the right eye every 4 (four) hours for 7 days, Starting Tue 5/3/2022, Until Tue 5/10/2022, Normal       !! - Potential duplicate medications found  Please discuss with provider  CONTINUE these medications which have NOT CHANGED    Details   albuterol (PROVENTIL HFA,VENTOLIN HFA) 90 mcg/act inhaler Inhale 2 puffs every 6 (six) hours as needed for wheezing, Historical Med      !! ciprofloxacin (CILOXAN) 0 3 % ophthalmic solution Administer 2 drops to the right eye every 4 (four) hours, Starting Sun 6/27/2021, Normal      erythromycin (ILOTYCIN) ophthalmic ointment Place a 1/2 inch ribbon of ointment into the lower eyelid 4 times a day, Normal      imatinib (GLEEVEC) 400 mg tablet Starting u 4/22/2021, Historical Med      lisinopril (ZESTRIL) 10 mg tablet Take 10 mg by mouth daily, Historical Med      omeprazole (PriLOSEC) 20 mg delayed release capsule Take 1 capsule (20 mg total) by mouth daily, Starting Tue 7/27/2021, Normal       !! - Potential duplicate medications found  Please discuss with provider  No discharge procedures on file      PDMP Review     None          ED Provider  Electronically Signed by           Qing Mehta MD  05/04/22 9548

## 2022-05-04 NOTE — ED PROCEDURE NOTE
PROCEDURE  Foreign Body - Embedded    Date/Time: 5/3/2022 9:50 PM  Performed by: Velia Barrios MD  Authorized by: Velia Barrios MD     Patient location:  ED  Consent:     Consent obtained:  Verbal    Consent given by:  Patient    Risks discussed:  Pain, infection and worsening of condition  Universal protocol:     Procedure explained and questions answered to patient or proxy's satisfaction: yes      Relevant documents present and verified: yes      Patient identity confirmed:  Arm band  Location:     Location: right eye   Pre-procedure details:     Imaging:  None  Procedure details:     Removal mechanism:  Cotton swab    Removal Method:  Open    Foreign bodies recovered:  1    Description:  Partial removal of metal     Intact foreign body removal: no    Post-procedure details:     Neurovascular status: intact      Confirmation:  No additional foreign bodies on visualization    Skin closure:  None    Patient tolerance of procedure:   Tolerated well, no immediate complications         Velia Barrios MD  05/04/22 9629

## 2024-09-06 ENCOUNTER — HOSPITAL ENCOUNTER (EMERGENCY)
Facility: HOSPITAL | Age: 54
Discharge: HOME | End: 2024-09-06

## 2024-09-06 VITALS
DIASTOLIC BLOOD PRESSURE: 98 MMHG | HEART RATE: 82 BPM | TEMPERATURE: 97.6 F | SYSTOLIC BLOOD PRESSURE: 203 MMHG | RESPIRATION RATE: 18 BRPM | OXYGEN SATURATION: 97 %

## 2024-09-06 DIAGNOSIS — R03.0 ELEVATED BLOOD PRESSURE READING: ICD-10-CM

## 2024-09-06 DIAGNOSIS — K08.89 PAIN, DENTAL: Primary | ICD-10-CM

## 2024-09-06 PROCEDURE — 99282 EMERGENCY DEPT VISIT SF MDM: CPT

## 2024-09-06 PROCEDURE — 96372 THER/PROPH/DIAG INJ SC/IM: CPT

## 2024-09-06 PROCEDURE — 64450 NJX AA&/STRD OTHER PN/BRANCH: CPT

## 2024-09-06 PROCEDURE — 99284 EMERGENCY DEPT VISIT MOD MDM: CPT

## 2024-09-06 RX ORDER — AMOXICILLIN 500 MG/1
500 CAPSULE ORAL EVERY 12 HOURS SCHEDULED
Qty: 14 CAPSULE | Refills: 0 | Status: SHIPPED | OUTPATIENT
Start: 2024-09-06 | End: 2024-09-13

## 2024-09-06 RX ORDER — ROPIVACAINE HYDROCHLORIDE 5 MG/ML
15 INJECTION, SOLUTION EPIDURAL; INFILTRATION; PERINEURAL ONCE
Status: COMPLETED | OUTPATIENT
Start: 2024-09-06 | End: 2024-09-06

## 2024-09-06 RX ORDER — KETOROLAC TROMETHAMINE 30 MG/ML
15 INJECTION, SOLUTION INTRAMUSCULAR; INTRAVENOUS ONCE
Status: COMPLETED | OUTPATIENT
Start: 2024-09-06 | End: 2024-09-06

## 2024-09-06 RX ORDER — CHLORHEXIDINE GLUCONATE ORAL RINSE 1.2 MG/ML
15 SOLUTION DENTAL 2 TIMES DAILY
Qty: 120 ML | Refills: 0 | Status: SHIPPED | OUTPATIENT
Start: 2024-09-06

## 2024-09-06 RX ORDER — AMOXICILLIN 500 MG/1
500 CAPSULE ORAL ONCE
Status: COMPLETED | OUTPATIENT
Start: 2024-09-06 | End: 2024-09-06

## 2024-09-06 RX ORDER — NAPROXEN 500 MG/1
500 TABLET ORAL 2 TIMES DAILY WITH MEALS
Qty: 30 TABLET | Refills: 0 | Status: SHIPPED | OUTPATIENT
Start: 2024-09-06

## 2024-09-06 RX ORDER — CHLORHEXIDINE GLUCONATE ORAL RINSE 1.2 MG/ML
15 SOLUTION DENTAL ONCE
Status: COMPLETED | OUTPATIENT
Start: 2024-09-06 | End: 2024-09-06

## 2024-09-06 RX ADMIN — KETOROLAC TROMETHAMINE 15 MG: 30 INJECTION, SOLUTION INTRAMUSCULAR at 03:46

## 2024-09-06 RX ADMIN — ROPIVACAINE HYDROCHLORIDE 15 ML: 5 INJECTION EPIDURAL; INFILTRATION; PERINEURAL at 03:46

## 2024-09-06 RX ADMIN — CHLORHEXIDINE GLUCONATE 15 ML: 1.2 RINSE ORAL at 03:46

## 2024-09-06 RX ADMIN — AMOXICILLIN 500 MG: 500 CAPSULE ORAL at 03:46

## 2024-09-06 NOTE — ED PROVIDER NOTES
History  Chief Complaint   Patient presents with    Dental Pain     Pt reports left sided dental pain since yesterday     HPI    Patient is a 53 y/o M presenting with acute dental pain. Patient states last week he tripped going up some stairs and chipped his front teeth. Starting acutely yesterday has severe left upper and left lower dental pain. States can't sleep due to severe pain. Recognizes has poor dentition but hasn't seen dentist due to insurance issues. Denies fever, difficulty opening mouth, difficulty swallowing/breathing.     Prior to Admission Medications   Prescriptions Last Dose Informant Patient Reported? Taking?   albuterol (PROVENTIL HFA,VENTOLIN HFA) 90 mcg/act inhaler   Yes No   Sig: Inhale 2 puffs every 6 (six) hours as needed for wheezing   ciprofloxacin (CILOXAN) 0.3 % ophthalmic solution   No No   Sig: Administer 2 drops to the right eye every 4 (four) hours   Patient not taking: Reported on 7/27/2021   erythromycin (ILOTYCIN) ophthalmic ointment   No No   Sig: Place a 1/2 inch ribbon of ointment into the lower eyelid 4 times a day   imatinib (GLEEVEC) 400 mg tablet   Yes No   lisinopril (ZESTRIL) 10 mg tablet   Yes No   Sig: Take 10 mg by mouth daily   omeprazole (PriLOSEC) 20 mg delayed release capsule   No No   Sig: Take 1 capsule (20 mg total) by mouth daily      Facility-Administered Medications: None       Past Medical History:   Diagnosis Date    Cancer (HCC)     small intestine    Hypertension     Stomach ulcer        Past Surgical History:   Procedure Laterality Date    CORONARY ARTERY BYPASS GRAFT      HERNIA REPAIR      NO PAST SURGERIES         History reviewed. No pertinent family history.  I have reviewed and agree with the history as documented.    E-Cigarette/Vaping    E-Cigarette Use Never User      E-Cigarette/Vaping Substances     Social History     Tobacco Use    Smoking status: Every Day     Current packs/day: 1.00     Types: Cigarettes    Smokeless tobacco: Never    Vaping Use    Vaping status: Never Used   Substance Use Topics    Alcohol use: Yes     Comment: occasionally    Drug use: No       Review of Systems   Constitutional:  Negative for chills and fever.   HENT:  Positive for dental problem. Negative for ear pain and sore throat.    Eyes:  Negative for pain and visual disturbance.   Respiratory:  Negative for cough and shortness of breath.    Cardiovascular:  Negative for chest pain and palpitations.   Gastrointestinal:  Negative for abdominal pain and vomiting.   Genitourinary:  Negative for dysuria and hematuria.   Musculoskeletal:  Negative for arthralgias and back pain.   Skin:  Negative for color change and rash.   Neurological:  Negative for seizures and syncope.   All other systems reviewed and are negative.      Physical Exam  Physical Exam  Vitals and nursing note reviewed.   Constitutional:       General: He is not in acute distress.     Appearance: He is well-developed. He is not toxic-appearing.   HENT:      Head: Normocephalic and atraumatic.      Right Ear: External ear normal.      Left Ear: External ear normal.      Nose: Nose normal.      Mouth/Throat:      Pharynx: Oropharynx is clear. No oropharyngeal exudate or posterior oropharyngeal erythema.        Comments: Poor dentition, multiple missing/chipped teeth. At sites of pain, no tooth up gum line. No perigingival abscess palpated. No trismus or facial swelling. Floor of mouth soft.   Eyes:      Extraocular Movements: Extraocular movements intact.      Conjunctiva/sclera: Conjunctivae normal.      Pupils: Pupils are equal, round, and reactive to light.   Cardiovascular:      Rate and Rhythm: Normal rate and regular rhythm.      Pulses: Normal pulses.      Heart sounds: Normal heart sounds. No murmur heard.     No friction rub. No gallop.   Pulmonary:      Effort: Pulmonary effort is normal. No respiratory distress.      Breath sounds: Normal breath sounds. No wheezing, rhonchi or rales.   Abdominal:  "     General: Abdomen is flat.      Palpations: Abdomen is soft.      Tenderness: There is no abdominal tenderness. There is no guarding or rebound.   Musculoskeletal:         General: Normal range of motion.      Cervical back: Normal range of motion. No rigidity.      Right lower leg: No edema.      Left lower leg: No edema.   Skin:     General: Skin is warm and dry.      Capillary Refill: Capillary refill takes less than 2 seconds.   Neurological:      General: No focal deficit present.      Mental Status: He is alert.   Psychiatric:         Mood and Affect: Mood normal.         Vital Signs  ED Triage Vitals [09/06/24 0331]   Temperature Pulse Respirations Blood Pressure SpO2   97.6 °F (36.4 °C) 82 18 (!) 203/98 97 %      Temp Source Heart Rate Source Patient Position - Orthostatic VS BP Location FiO2 (%)   Temporal Monitor -- Left arm --      Pain Score       10 - Worst Possible Pain           Vitals:    09/06/24 0331   BP: (!) 203/98   Pulse: 82         Visual Acuity      ED Medications  Medications   chlorhexidine (PERIDEX) 0.12 % oral rinse 15 mL (15 mL Swish & Spit Given 9/6/24 0346)   ropivacaine (NAROPIN) 0.5 % injection 15 mL (15 mL Infiltration Given 9/6/24 0346)   amoxicillin (AMOXIL) capsule 500 mg (500 mg Oral Given 9/6/24 0346)   ketorolac (TORADOL) injection 15 mg (15 mg Intramuscular Given 9/6/24 0346)       Diagnostic Studies  Results Reviewed       None                   No orders to display              Procedures  Nerve block    Date/Time: 9/6/2024 3:48 AM    Performed by: Darek Shaver MD  Authorized by: Darek Shaver MD    Patient location:  ED  [place] Protocol:  procedure performed by consultantConsent: Verbal consent obtained.  Risks and benefits: risks, benefits and alternatives were discussed  Consent given by: patient  Time out: Immediately prior to procedure a \"time out\" was called to verify the correct patient, procedure, equipment, support staff and site/side marked as " required.  Timeout called at: 9/6/2024 3:48 AM.  Patient identity confirmed: verbally with patient and arm band    Indications:     Indications:  Pain relief  Location:     Body area:  Head    Head nerve blocked: left alveolar.    Laterality:  Left  Pre-procedure details:     Skin preparation:  2% chlorhexidine  Procedure details (see MAR for exact dosages):     Block needle gauge:  25 G    Anesthetic injected:  Ropivacaine 0.5%    Steroid injected:  None    Additive injected:  None    Injection procedure:  Anatomic landmarks identified, anatomic landmarks palpated, introduced needle, incremental injection and negative aspiration for blood  Post-procedure details:     Outcome:  Pain relieved    Patient tolerance of procedure:  Tolerated well, no immediate complications           ED Course                                 SBIRT 22yo+      Flowsheet Row Most Recent Value   Initial Alcohol Screen: US AUDIT-C     1. How often do you have a drink containing alcohol? 3 Filed at: 09/06/2024 0333   2. How many drinks containing alcohol do you have on a typical day you are drinking?  0 Filed at: 09/06/2024 0333   3a. Male UNDER 65: How often do you have five or more drinks on one occasion? 0 Filed at: 09/06/2024 0333   3b. FEMALE Any Age, or MALE 65+: How often do you have 4 or more drinks on one occassion? 0 Filed at: 09/06/2024 0333   Audit-C Score 3 Filed at: 09/06/2024 0333   MERVIN: How many times in the past year have you...    Used an illegal drug or used a prescription medication for non-medical reasons? Never Filed at: 09/06/2024 0333                      Medical Decision Making  53 y/o M poor dentition with dentalgia left upper and lower molar areas. Elevated BP reading d/w pt -- states didn't take AM BP medications and is comfortable following up with his PCP for this within the next few weeks. Dental pain possibly 2/2 periapical abscess, will cover with abx. Pt with pain relief from dental block. Recommend dental  and oral surgery f/u. RTED precautions given and pt discharged.                  Disposition  Final diagnoses:   Pain, dental   Elevated blood pressure reading     Time reflects when diagnosis was documented in both MDM as applicable and the Disposition within this note       Time User Action Codes Description Comment    9/6/2024  3:33 AM Darek Shaver Add [K08.89] Pain, dental     9/6/2024  3:33 AM Sivakumar, Darek Add [R03.0] Elevated blood pressure reading           ED Disposition       ED Disposition   Discharge    Condition   Stable    Date/Time   Fri Sep 6, 2024 0350    Comment   Donal Rose discharge to home/self care.                   Follow-up Information       Follow up With Specialties Details Why Contact Info    [facility] [place]  Schedule an appointment as soon as possible for a visit   [address] [facility] [address] [phone]    Patricia Forbes DO Family Medicine Schedule an appointment as soon as possible for a visit   [address] [facility] [address] [phone]              Discharge Medication List as of 9/6/2024  3:51 AM        START taking these medications    Details   amoxicillin (AMOXIL) 500 mg capsule Take 1 capsule (500 mg total) by mouth every 12 (twelve) hours for 7 days, Starting Fri 9/6/2024, Until Fri 9/13/2024, Normal      chlorhexidine (PERIDEX) 0.12 % solution Apply 15 mL to the mouth or throat 2 (two) times a day, Starting Fri 9/6/2024, Normal      naproxen (Naprosyn) 500 mg tablet Take 1 tablet (500 mg total) by mouth 2 (two) times a day with meals, Starting Fri 9/6/2024, Normal           CONTINUE these medications which have NOT CHANGED    Details   albuterol (PROVENTIL HFA,VENTOLIN HFA) 90 mcg/act inhaler Inhale 2 puffs every 6 (six) hours as needed for wheezing, Historical Med      ciprofloxacin (CILOXAN) 0.3 % ophthalmic solution Administer 2 drops to the right eye every 4 (four) hours, Starting Sun  6/27/2021, Normal      erythromycin (ILOTYCIN) ophthalmic ointment Place a 1/2 inch ribbon of ointment into the lower eyelid 4 times a day, Normal      imatinib (GLEEVEC) 400 mg tablet Starting Thu 4/22/2021, Historical Med      lisinopril (ZESTRIL) 10 mg tablet Take 10 mg by mouth daily, Historical Med      omeprazole (PriLOSEC) 20 mg delayed release capsule Take 1 capsule (20 mg total) by mouth daily, Starting e 7/27/2021, Normal                 PDMP Review       None            ED Provider  Electronically Signed by             Darek Shaver MD  09/06/24 1324